# Patient Record
Sex: FEMALE | Race: WHITE | NOT HISPANIC OR LATINO | Employment: FULL TIME | URBAN - METROPOLITAN AREA
[De-identification: names, ages, dates, MRNs, and addresses within clinical notes are randomized per-mention and may not be internally consistent; named-entity substitution may affect disease eponyms.]

---

## 2017-01-04 ENCOUNTER — AMBULATORY - HEALTHEAST (OUTPATIENT)
Dept: FAMILY MEDICINE | Facility: CLINIC | Age: 27
End: 2017-01-04

## 2017-01-04 ENCOUNTER — COMMUNICATION - HEALTHEAST (OUTPATIENT)
Dept: FAMILY MEDICINE | Facility: CLINIC | Age: 27
End: 2017-01-04

## 2017-01-04 DIAGNOSIS — F98.8 ADD (ATTENTION DEFICIT DISORDER): ICD-10-CM

## 2017-01-06 ENCOUNTER — COMMUNICATION - HEALTHEAST (OUTPATIENT)
Dept: FAMILY MEDICINE | Facility: CLINIC | Age: 27
End: 2017-01-06

## 2017-01-06 DIAGNOSIS — F98.8 ADD (ATTENTION DEFICIT DISORDER): ICD-10-CM

## 2017-01-12 ENCOUNTER — COMMUNICATION - HEALTHEAST (OUTPATIENT)
Dept: FAMILY MEDICINE | Facility: CLINIC | Age: 27
End: 2017-01-12

## 2017-01-19 ENCOUNTER — COMMUNICATION - HEALTHEAST (OUTPATIENT)
Dept: FAMILY MEDICINE | Facility: CLINIC | Age: 27
End: 2017-01-19

## 2017-01-30 ENCOUNTER — COMMUNICATION - HEALTHEAST (OUTPATIENT)
Dept: FAMILY MEDICINE | Facility: CLINIC | Age: 27
End: 2017-01-30

## 2017-01-30 DIAGNOSIS — J45.41 MODERATE PERSISTENT ASTHMA WITH EXACERBATION: ICD-10-CM

## 2017-02-08 ENCOUNTER — COMMUNICATION - HEALTHEAST (OUTPATIENT)
Dept: FAMILY MEDICINE | Facility: CLINIC | Age: 27
End: 2017-02-08

## 2017-02-08 ENCOUNTER — OFFICE VISIT - HEALTHEAST (OUTPATIENT)
Dept: FAMILY MEDICINE | Facility: CLINIC | Age: 27
End: 2017-02-08

## 2017-02-08 DIAGNOSIS — J32.9 BACTERIAL SINUSITIS: ICD-10-CM

## 2017-02-08 DIAGNOSIS — F98.8 ADD (ATTENTION DEFICIT DISORDER): ICD-10-CM

## 2017-02-08 DIAGNOSIS — B96.89 BACTERIAL SINUSITIS: ICD-10-CM

## 2017-02-17 ENCOUNTER — COMMUNICATION - HEALTHEAST (OUTPATIENT)
Dept: FAMILY MEDICINE | Facility: CLINIC | Age: 27
End: 2017-02-17

## 2017-02-17 DIAGNOSIS — F17.200 TOBACCO USE DISORDER: ICD-10-CM

## 2017-03-10 ENCOUNTER — COMMUNICATION - HEALTHEAST (OUTPATIENT)
Dept: FAMILY MEDICINE | Facility: CLINIC | Age: 27
End: 2017-03-10

## 2017-03-10 DIAGNOSIS — F98.8 ADD (ATTENTION DEFICIT DISORDER): ICD-10-CM

## 2017-03-28 ENCOUNTER — COMMUNICATION - HEALTHEAST (OUTPATIENT)
Dept: FAMILY MEDICINE | Facility: CLINIC | Age: 27
End: 2017-03-28

## 2017-03-28 DIAGNOSIS — F17.200 TOBACCO USE DISORDER: ICD-10-CM

## 2017-04-10 ENCOUNTER — COMMUNICATION - HEALTHEAST (OUTPATIENT)
Dept: FAMILY MEDICINE | Facility: CLINIC | Age: 27
End: 2017-04-10

## 2017-04-10 DIAGNOSIS — F98.8 ADD (ATTENTION DEFICIT DISORDER): ICD-10-CM

## 2017-04-18 ENCOUNTER — COMMUNICATION - HEALTHEAST (OUTPATIENT)
Dept: FAMILY MEDICINE | Facility: CLINIC | Age: 27
End: 2017-04-18

## 2017-04-18 DIAGNOSIS — F17.200 TOBACCO USE DISORDER: ICD-10-CM

## 2017-05-06 ENCOUNTER — COMMUNICATION - HEALTHEAST (OUTPATIENT)
Dept: FAMILY MEDICINE | Facility: CLINIC | Age: 27
End: 2017-05-06

## 2017-05-06 DIAGNOSIS — F17.200 TOBACCO USE DISORDER: ICD-10-CM

## 2017-05-07 ENCOUNTER — COMMUNICATION - HEALTHEAST (OUTPATIENT)
Dept: FAMILY MEDICINE | Facility: CLINIC | Age: 27
End: 2017-05-07

## 2017-05-07 DIAGNOSIS — J98.01 BRONCHOSPASM: ICD-10-CM

## 2017-05-17 ENCOUNTER — COMMUNICATION - HEALTHEAST (OUTPATIENT)
Dept: FAMILY MEDICINE | Facility: CLINIC | Age: 27
End: 2017-05-17

## 2017-05-17 DIAGNOSIS — F98.8 ADD (ATTENTION DEFICIT DISORDER): ICD-10-CM

## 2017-05-24 ENCOUNTER — COMMUNICATION - HEALTHEAST (OUTPATIENT)
Dept: FAMILY MEDICINE | Facility: CLINIC | Age: 27
End: 2017-05-24

## 2017-05-24 DIAGNOSIS — F17.200 TOBACCO USE DISORDER: ICD-10-CM

## 2017-06-13 ENCOUNTER — COMMUNICATION - HEALTHEAST (OUTPATIENT)
Dept: FAMILY MEDICINE | Facility: CLINIC | Age: 27
End: 2017-06-13

## 2017-06-13 DIAGNOSIS — F98.8 ADD (ATTENTION DEFICIT DISORDER): ICD-10-CM

## 2017-07-07 ENCOUNTER — OFFICE VISIT - HEALTHEAST (OUTPATIENT)
Dept: FAMILY MEDICINE | Facility: CLINIC | Age: 27
End: 2017-07-07

## 2017-07-07 DIAGNOSIS — A74.9 POSITIVE CHLAMYIDA TEST: ICD-10-CM

## 2017-07-07 DIAGNOSIS — Z11.3 SCREENING FOR STD (SEXUALLY TRANSMITTED DISEASE): ICD-10-CM

## 2017-07-07 DIAGNOSIS — Z30.09 FAMILY PLANNING: ICD-10-CM

## 2017-07-07 DIAGNOSIS — J45.909 ASTHMA: ICD-10-CM

## 2017-07-07 LAB — HIV 1+2 AB+HIV1 P24 AG SERPL QL IA: NEGATIVE

## 2017-07-10 LAB — SYPHILIS RPR SCREEN - HISTORICAL: NORMAL

## 2017-07-12 ENCOUNTER — COMMUNICATION - HEALTHEAST (OUTPATIENT)
Dept: FAMILY MEDICINE | Facility: CLINIC | Age: 27
End: 2017-07-12

## 2017-07-13 ENCOUNTER — COMMUNICATION - HEALTHEAST (OUTPATIENT)
Dept: FAMILY MEDICINE | Facility: CLINIC | Age: 27
End: 2017-07-13

## 2017-07-13 DIAGNOSIS — F98.8 ADD (ATTENTION DEFICIT DISORDER): ICD-10-CM

## 2017-07-17 ENCOUNTER — AMBULATORY - HEALTHEAST (OUTPATIENT)
Dept: FAMILY MEDICINE | Facility: CLINIC | Age: 27
End: 2017-07-17

## 2017-07-17 DIAGNOSIS — L70.9 ACNE: ICD-10-CM

## 2017-07-17 DIAGNOSIS — E03.9 HYPOTHYROID: ICD-10-CM

## 2017-07-17 DIAGNOSIS — R63.5 WEIGHT GAIN: ICD-10-CM

## 2017-07-17 DIAGNOSIS — N93.8 DUB (DYSFUNCTIONAL UTERINE BLEEDING): ICD-10-CM

## 2017-07-26 ENCOUNTER — OFFICE VISIT - HEALTHEAST (OUTPATIENT)
Dept: FAMILY MEDICINE | Facility: CLINIC | Age: 27
End: 2017-07-26

## 2017-07-26 DIAGNOSIS — H10.10 ALLERGIC CONJUNCTIVITIS: ICD-10-CM

## 2017-08-24 ENCOUNTER — COMMUNICATION - HEALTHEAST (OUTPATIENT)
Dept: FAMILY MEDICINE | Facility: CLINIC | Age: 27
End: 2017-08-24

## 2017-08-24 DIAGNOSIS — F98.8 ADD (ATTENTION DEFICIT DISORDER): ICD-10-CM

## 2017-09-25 ENCOUNTER — COMMUNICATION - HEALTHEAST (OUTPATIENT)
Dept: FAMILY MEDICINE | Facility: CLINIC | Age: 27
End: 2017-09-25

## 2017-09-25 DIAGNOSIS — F98.8 ADD (ATTENTION DEFICIT DISORDER): ICD-10-CM

## 2017-10-03 ENCOUNTER — OFFICE VISIT - HEALTHEAST (OUTPATIENT)
Dept: FAMILY MEDICINE | Facility: CLINIC | Age: 27
End: 2017-10-03

## 2017-10-03 DIAGNOSIS — N93.8 DUB (DYSFUNCTIONAL UTERINE BLEEDING): ICD-10-CM

## 2017-10-03 DIAGNOSIS — R63.5 WEIGHT GAIN: ICD-10-CM

## 2017-10-03 DIAGNOSIS — F33.0 MILD EPISODE OF RECURRENT MAJOR DEPRESSIVE DISORDER (H): ICD-10-CM

## 2017-10-03 DIAGNOSIS — F90.0 ATTENTION DEFICIT HYPERACTIVITY DISORDER (ADHD), PREDOMINANTLY INATTENTIVE TYPE: ICD-10-CM

## 2017-10-03 DIAGNOSIS — E28.2 PCOS (POLYCYSTIC OVARIAN SYNDROME): ICD-10-CM

## 2017-10-03 DIAGNOSIS — E03.9 HYPOTHYROID: ICD-10-CM

## 2017-10-03 DIAGNOSIS — L70.9 ACNE: ICD-10-CM

## 2017-10-03 DIAGNOSIS — E66.9 OBESITY: ICD-10-CM

## 2017-10-03 DIAGNOSIS — J98.01 BRONCHOSPASM: ICD-10-CM

## 2017-10-03 DIAGNOSIS — J45.41 MODERATE PERSISTENT ASTHMA WITH EXACERBATION: ICD-10-CM

## 2017-10-04 ENCOUNTER — COMMUNICATION - HEALTHEAST (OUTPATIENT)
Dept: SCHEDULING | Facility: CLINIC | Age: 27
End: 2017-10-04

## 2017-10-04 ENCOUNTER — COMMUNICATION - HEALTHEAST (OUTPATIENT)
Dept: FAMILY MEDICINE | Facility: CLINIC | Age: 27
End: 2017-10-04

## 2017-10-07 ENCOUNTER — COMMUNICATION - HEALTHEAST (OUTPATIENT)
Dept: FAMILY MEDICINE | Facility: CLINIC | Age: 27
End: 2017-10-07

## 2017-10-09 ENCOUNTER — COMMUNICATION - HEALTHEAST (OUTPATIENT)
Dept: FAMILY MEDICINE | Facility: CLINIC | Age: 27
End: 2017-10-09

## 2017-10-09 ENCOUNTER — AMBULATORY - HEALTHEAST (OUTPATIENT)
Dept: FAMILY MEDICINE | Facility: CLINIC | Age: 27
End: 2017-10-09

## 2017-10-09 DIAGNOSIS — J98.01 BRONCHOSPASM: ICD-10-CM

## 2017-10-09 DIAGNOSIS — J45.41 MODERATE PERSISTENT ASTHMA WITH EXACERBATION: ICD-10-CM

## 2017-10-16 ENCOUNTER — RECORDS - HEALTHEAST (OUTPATIENT)
Dept: ADMINISTRATIVE | Facility: OTHER | Age: 27
End: 2017-10-16

## 2017-10-17 ENCOUNTER — AMBULATORY - HEALTHEAST (OUTPATIENT)
Dept: FAMILY MEDICINE | Facility: CLINIC | Age: 27
End: 2017-10-17

## 2017-11-02 ENCOUNTER — COMMUNICATION - HEALTHEAST (OUTPATIENT)
Dept: FAMILY MEDICINE | Facility: CLINIC | Age: 27
End: 2017-11-02

## 2017-11-03 ENCOUNTER — AMBULATORY - HEALTHEAST (OUTPATIENT)
Dept: FAMILY MEDICINE | Facility: CLINIC | Age: 27
End: 2017-11-03

## 2017-11-03 DIAGNOSIS — M79.18 MYOFASCIAL PAIN SYNDROME, CERVICAL: ICD-10-CM

## 2017-12-26 ENCOUNTER — COMMUNICATION - HEALTHEAST (OUTPATIENT)
Dept: FAMILY MEDICINE | Facility: CLINIC | Age: 27
End: 2017-12-26

## 2017-12-26 DIAGNOSIS — F90.0 ATTENTION DEFICIT HYPERACTIVITY DISORDER (ADHD), PREDOMINANTLY INATTENTIVE TYPE: ICD-10-CM

## 2017-12-28 ENCOUNTER — AMBULATORY - HEALTHEAST (OUTPATIENT)
Dept: FAMILY MEDICINE | Facility: CLINIC | Age: 27
End: 2017-12-28

## 2018-01-04 ENCOUNTER — COMMUNICATION - HEALTHEAST (OUTPATIENT)
Dept: FAMILY MEDICINE | Facility: CLINIC | Age: 28
End: 2018-01-04

## 2018-01-04 ENCOUNTER — RECORDS - HEALTHEAST (OUTPATIENT)
Dept: ADMINISTRATIVE | Facility: OTHER | Age: 28
End: 2018-01-04

## 2018-01-10 ENCOUNTER — COMMUNICATION - HEALTHEAST (OUTPATIENT)
Dept: FAMILY MEDICINE | Facility: CLINIC | Age: 28
End: 2018-01-10

## 2018-01-10 DIAGNOSIS — H10.10 ALLERGIC CONJUNCTIVITIS: ICD-10-CM

## 2018-01-31 ENCOUNTER — COMMUNICATION - HEALTHEAST (OUTPATIENT)
Dept: FAMILY MEDICINE | Facility: CLINIC | Age: 28
End: 2018-01-31

## 2018-01-31 DIAGNOSIS — F90.0 ATTENTION DEFICIT HYPERACTIVITY DISORDER (ADHD), PREDOMINANTLY INATTENTIVE TYPE: ICD-10-CM

## 2018-03-06 ENCOUNTER — COMMUNICATION - HEALTHEAST (OUTPATIENT)
Dept: SCHEDULING | Facility: CLINIC | Age: 28
End: 2018-03-06

## 2018-03-06 ENCOUNTER — COMMUNICATION - HEALTHEAST (OUTPATIENT)
Dept: FAMILY MEDICINE | Facility: CLINIC | Age: 28
End: 2018-03-06

## 2018-03-06 DIAGNOSIS — J98.01 BRONCHOSPASM: ICD-10-CM

## 2018-03-06 DIAGNOSIS — J45.41 MODERATE PERSISTENT ASTHMA WITH EXACERBATION: ICD-10-CM

## 2018-03-06 DIAGNOSIS — F90.0 ATTENTION DEFICIT HYPERACTIVITY DISORDER (ADHD), PREDOMINANTLY INATTENTIVE TYPE: ICD-10-CM

## 2018-03-12 ENCOUNTER — COMMUNICATION - HEALTHEAST (OUTPATIENT)
Dept: FAMILY MEDICINE | Facility: CLINIC | Age: 28
End: 2018-03-12

## 2018-03-13 ENCOUNTER — COMMUNICATION - HEALTHEAST (OUTPATIENT)
Dept: FAMILY MEDICINE | Facility: CLINIC | Age: 28
End: 2018-03-13

## 2018-03-13 ENCOUNTER — AMBULATORY - HEALTHEAST (OUTPATIENT)
Dept: FAMILY MEDICINE | Facility: CLINIC | Age: 28
End: 2018-03-13

## 2018-04-04 ENCOUNTER — COMMUNICATION - HEALTHEAST (OUTPATIENT)
Dept: FAMILY MEDICINE | Facility: CLINIC | Age: 28
End: 2018-04-04

## 2018-04-04 DIAGNOSIS — F90.0 ATTENTION DEFICIT HYPERACTIVITY DISORDER (ADHD), PREDOMINANTLY INATTENTIVE TYPE: ICD-10-CM

## 2018-04-10 ENCOUNTER — COMMUNICATION - HEALTHEAST (OUTPATIENT)
Dept: SCHEDULING | Facility: CLINIC | Age: 28
End: 2018-04-10

## 2018-04-11 ENCOUNTER — OFFICE VISIT - HEALTHEAST (OUTPATIENT)
Dept: FAMILY MEDICINE | Facility: CLINIC | Age: 28
End: 2018-04-11

## 2018-04-11 DIAGNOSIS — R05.9 COUGH: ICD-10-CM

## 2018-04-11 DIAGNOSIS — J20.9 ACUTE BRONCHITIS WITH BRONCHOSPASM: ICD-10-CM

## 2018-04-16 ENCOUNTER — COMMUNICATION - HEALTHEAST (OUTPATIENT)
Dept: ADMINISTRATIVE | Facility: CLINIC | Age: 28
End: 2018-04-16

## 2018-04-16 ENCOUNTER — AMBULATORY - HEALTHEAST (OUTPATIENT)
Dept: MIDWIFE SERVICES | Facility: CLINIC | Age: 28
End: 2018-04-16

## 2018-04-16 ENCOUNTER — OFFICE VISIT - HEALTHEAST (OUTPATIENT)
Dept: FAMILY MEDICINE | Facility: CLINIC | Age: 28
End: 2018-04-16

## 2018-04-16 DIAGNOSIS — E03.9 HYPOTHYROID: ICD-10-CM

## 2018-04-16 DIAGNOSIS — E66.9 OBESITY: ICD-10-CM

## 2018-04-16 DIAGNOSIS — Z32.00 POSSIBLE PREGNANCY: ICD-10-CM

## 2018-04-16 LAB
T4 FREE SERPL-MCNC: 0.6 NG/DL (ref 0.7–1.8)
TSH SERPL DL<=0.005 MIU/L-ACNC: 32.7 UIU/ML (ref 0.3–5)

## 2018-04-18 ENCOUNTER — COMMUNICATION - HEALTHEAST (OUTPATIENT)
Dept: FAMILY MEDICINE | Facility: CLINIC | Age: 28
End: 2018-04-18

## 2018-05-02 ENCOUNTER — COMMUNICATION - HEALTHEAST (OUTPATIENT)
Dept: FAMILY MEDICINE | Facility: CLINIC | Age: 28
End: 2018-05-02

## 2018-05-02 DIAGNOSIS — F90.0 ATTENTION DEFICIT HYPERACTIVITY DISORDER (ADHD), PREDOMINANTLY INATTENTIVE TYPE: ICD-10-CM

## 2018-05-22 ENCOUNTER — COMMUNICATION - HEALTHEAST (OUTPATIENT)
Dept: FAMILY MEDICINE | Facility: CLINIC | Age: 28
End: 2018-05-22

## 2018-05-22 DIAGNOSIS — R05.9 COUGH: ICD-10-CM

## 2018-05-22 DIAGNOSIS — J20.9 ACUTE BRONCHITIS WITH BRONCHOSPASM: ICD-10-CM

## 2018-05-29 ENCOUNTER — COMMUNICATION - HEALTHEAST (OUTPATIENT)
Dept: FAMILY MEDICINE | Facility: CLINIC | Age: 28
End: 2018-05-29

## 2018-06-04 ENCOUNTER — COMMUNICATION - HEALTHEAST (OUTPATIENT)
Dept: FAMILY MEDICINE | Facility: CLINIC | Age: 28
End: 2018-06-04

## 2018-06-04 DIAGNOSIS — F90.0 ATTENTION DEFICIT HYPERACTIVITY DISORDER (ADHD), PREDOMINANTLY INATTENTIVE TYPE: ICD-10-CM

## 2018-07-05 ENCOUNTER — COMMUNICATION - HEALTHEAST (OUTPATIENT)
Dept: FAMILY MEDICINE | Facility: CLINIC | Age: 28
End: 2018-07-05

## 2018-07-05 DIAGNOSIS — F90.0 ATTENTION DEFICIT HYPERACTIVITY DISORDER (ADHD), PREDOMINANTLY INATTENTIVE TYPE: ICD-10-CM

## 2018-08-01 ENCOUNTER — COMMUNICATION - HEALTHEAST (OUTPATIENT)
Dept: FAMILY MEDICINE | Facility: CLINIC | Age: 28
End: 2018-08-01

## 2018-08-01 DIAGNOSIS — F90.0 ATTENTION DEFICIT HYPERACTIVITY DISORDER (ADHD), PREDOMINANTLY INATTENTIVE TYPE: ICD-10-CM

## 2018-09-05 ENCOUNTER — COMMUNICATION - HEALTHEAST (OUTPATIENT)
Dept: FAMILY MEDICINE | Facility: CLINIC | Age: 28
End: 2018-09-05

## 2018-09-05 DIAGNOSIS — F90.0 ATTENTION DEFICIT HYPERACTIVITY DISORDER (ADHD), PREDOMINANTLY INATTENTIVE TYPE: ICD-10-CM

## 2018-10-02 ENCOUNTER — COMMUNICATION - HEALTHEAST (OUTPATIENT)
Dept: SCHEDULING | Facility: CLINIC | Age: 28
End: 2018-10-02

## 2018-10-02 ENCOUNTER — COMMUNICATION - HEALTHEAST (OUTPATIENT)
Dept: FAMILY MEDICINE | Facility: CLINIC | Age: 28
End: 2018-10-02

## 2018-10-02 DIAGNOSIS — F90.0 ATTENTION DEFICIT HYPERACTIVITY DISORDER (ADHD), PREDOMINANTLY INATTENTIVE TYPE: ICD-10-CM

## 2018-11-05 ENCOUNTER — COMMUNICATION - HEALTHEAST (OUTPATIENT)
Dept: FAMILY MEDICINE | Facility: CLINIC | Age: 28
End: 2018-11-05

## 2018-11-05 DIAGNOSIS — F90.0 ATTENTION DEFICIT HYPERACTIVITY DISORDER (ADHD), PREDOMINANTLY INATTENTIVE TYPE: ICD-10-CM

## 2018-12-06 ENCOUNTER — COMMUNICATION - HEALTHEAST (OUTPATIENT)
Dept: FAMILY MEDICINE | Facility: CLINIC | Age: 28
End: 2018-12-06

## 2018-12-06 DIAGNOSIS — F90.0 ATTENTION DEFICIT HYPERACTIVITY DISORDER (ADHD), PREDOMINANTLY INATTENTIVE TYPE: ICD-10-CM

## 2019-01-17 ENCOUNTER — COMMUNICATION - HEALTHEAST (OUTPATIENT)
Dept: FAMILY MEDICINE | Facility: CLINIC | Age: 29
End: 2019-01-17

## 2019-01-17 DIAGNOSIS — F90.0 ATTENTION DEFICIT HYPERACTIVITY DISORDER (ADHD), PREDOMINANTLY INATTENTIVE TYPE: ICD-10-CM

## 2019-02-11 ENCOUNTER — COMMUNICATION - HEALTHEAST (OUTPATIENT)
Dept: FAMILY MEDICINE | Facility: CLINIC | Age: 29
End: 2019-02-11

## 2019-02-11 DIAGNOSIS — J45.41 MODERATE PERSISTENT ASTHMA WITH EXACERBATION: ICD-10-CM

## 2019-03-05 ENCOUNTER — COMMUNICATION - HEALTHEAST (OUTPATIENT)
Dept: FAMILY MEDICINE | Facility: CLINIC | Age: 29
End: 2019-03-05

## 2019-03-05 DIAGNOSIS — F90.0 ATTENTION DEFICIT HYPERACTIVITY DISORDER (ADHD), PREDOMINANTLY INATTENTIVE TYPE: ICD-10-CM

## 2019-03-18 ENCOUNTER — OFFICE VISIT - HEALTHEAST (OUTPATIENT)
Dept: FAMILY MEDICINE | Facility: CLINIC | Age: 29
End: 2019-03-18

## 2019-03-18 ENCOUNTER — COMMUNICATION - HEALTHEAST (OUTPATIENT)
Dept: FAMILY MEDICINE | Facility: CLINIC | Age: 29
End: 2019-03-18

## 2019-03-18 DIAGNOSIS — E03.9 HYPOTHYROIDISM, UNSPECIFIED TYPE: ICD-10-CM

## 2019-03-18 DIAGNOSIS — R05.9 COUGH: ICD-10-CM

## 2019-03-18 DIAGNOSIS — F98.8 ATTENTION DEFICIT DISORDER (ADD) WITHOUT HYPERACTIVITY: ICD-10-CM

## 2019-03-18 DIAGNOSIS — J20.9 ACUTE BRONCHITIS WITH BRONCHOSPASM: ICD-10-CM

## 2019-03-18 DIAGNOSIS — J98.01 BRONCHOSPASM: ICD-10-CM

## 2019-03-18 DIAGNOSIS — Z97.5 IUD CONTRACEPTION: ICD-10-CM

## 2019-03-18 DIAGNOSIS — F90.0 ATTENTION DEFICIT HYPERACTIVITY DISORDER (ADHD), PREDOMINANTLY INATTENTIVE TYPE: ICD-10-CM

## 2019-03-18 DIAGNOSIS — J45.40 MODERATE PERSISTENT ASTHMA WITHOUT COMPLICATION: ICD-10-CM

## 2019-03-18 LAB
AMPHETAMINES UR QL SCN: NORMAL
BARBITURATES UR QL: NORMAL
BENZODIAZ UR QL: NORMAL
CANNABINOIDS UR QL SCN: NORMAL
COCAINE UR QL: NORMAL
CREAT UR-MCNC: 156.5 MG/DL
METHADONE UR QL SCN: NORMAL
OPIATES UR QL SCN: NORMAL
OXYCODONE UR QL: NORMAL
PCP UR QL SCN: NORMAL
T3 SERPL-MCNC: 95 NG/DL (ref 45–175)
T4 FREE SERPL-MCNC: 0.8 NG/DL (ref 0.7–1.8)
T4 TOTAL - HISTORICAL: 5.9 UG/DL (ref 4.5–13)
TSH SERPL DL<=0.005 MIU/L-ACNC: 11.42 UIU/ML (ref 0.3–5)

## 2019-03-18 NOTE — ASSESSMENT & PLAN NOTE
"Psychological condition is unchanged.  Continue current treatment regimen.  Psychological conditionwill be reassessed at the next regular appointment     Paying attention difficult   don't finish things\"   Skipped and \"did not get anything done\".  Don' cathc first time around  "

## 2019-03-19 LAB — THYROID PEROXIDASE ANTIBODIES - HISTORICAL: 618.6 IU/ML (ref 0–5.6)

## 2019-03-20 ENCOUNTER — HOSPITAL ENCOUNTER (OUTPATIENT)
Dept: ULTRASOUND IMAGING | Facility: HOSPITAL | Age: 29
Discharge: HOME | End: 2019-03-20
Attending: FAMILY MEDICINE

## 2019-03-20 DIAGNOSIS — E03.9 HYPOTHYROIDISM, UNSPECIFIED TYPE: ICD-10-CM

## 2019-03-23 ENCOUNTER — COMMUNICATION - HEALTHEAST (OUTPATIENT)
Dept: FAMILY MEDICINE | Facility: CLINIC | Age: 29
End: 2019-03-23

## 2019-03-23 ENCOUNTER — AMBULATORY - HEALTHEAST (OUTPATIENT)
Dept: FAMILY MEDICINE | Facility: CLINIC | Age: 29
End: 2019-03-23

## 2019-03-23 DIAGNOSIS — E03.9 HYPOTHYROIDISM, UNSPECIFIED TYPE: ICD-10-CM

## 2019-04-29 ENCOUNTER — COMMUNICATION - HEALTHEAST (OUTPATIENT)
Dept: FAMILY MEDICINE | Facility: CLINIC | Age: 29
End: 2019-04-29

## 2019-05-01 ENCOUNTER — COMMUNICATION - HEALTHEAST (OUTPATIENT)
Dept: FAMILY MEDICINE | Facility: CLINIC | Age: 29
End: 2019-05-01

## 2019-05-01 DIAGNOSIS — F90.0 ATTENTION DEFICIT HYPERACTIVITY DISORDER (ADHD), PREDOMINANTLY INATTENTIVE TYPE: ICD-10-CM

## 2019-05-04 ENCOUNTER — COMMUNICATION - HEALTHEAST (OUTPATIENT)
Dept: FAMILY MEDICINE | Facility: CLINIC | Age: 29
End: 2019-05-04

## 2019-05-04 DIAGNOSIS — E03.9 ACQUIRED HYPOTHYROIDISM: ICD-10-CM

## 2019-06-27 ENCOUNTER — COMMUNICATION - HEALTHEAST (OUTPATIENT)
Dept: FAMILY MEDICINE | Facility: CLINIC | Age: 29
End: 2019-06-27

## 2019-06-27 DIAGNOSIS — F90.0 ATTENTION DEFICIT HYPERACTIVITY DISORDER (ADHD), PREDOMINANTLY INATTENTIVE TYPE: ICD-10-CM

## 2019-07-29 ENCOUNTER — COMMUNICATION - HEALTHEAST (OUTPATIENT)
Dept: FAMILY MEDICINE | Facility: CLINIC | Age: 29
End: 2019-07-29

## 2019-07-29 DIAGNOSIS — F90.0 ATTENTION DEFICIT HYPERACTIVITY DISORDER (ADHD), PREDOMINANTLY INATTENTIVE TYPE: ICD-10-CM

## 2019-09-12 ENCOUNTER — COMMUNICATION - HEALTHEAST (OUTPATIENT)
Dept: FAMILY MEDICINE | Facility: CLINIC | Age: 29
End: 2019-09-12

## 2019-09-12 DIAGNOSIS — F90.0 ATTENTION DEFICIT HYPERACTIVITY DISORDER (ADHD), PREDOMINANTLY INATTENTIVE TYPE: ICD-10-CM

## 2019-10-10 ENCOUNTER — COMMUNICATION - HEALTHEAST (OUTPATIENT)
Dept: FAMILY MEDICINE | Facility: CLINIC | Age: 29
End: 2019-10-10

## 2019-10-10 DIAGNOSIS — F90.0 ATTENTION DEFICIT HYPERACTIVITY DISORDER (ADHD), PREDOMINANTLY INATTENTIVE TYPE: ICD-10-CM

## 2019-10-20 ENCOUNTER — COMMUNICATION - HEALTHEAST (OUTPATIENT)
Dept: FAMILY MEDICINE | Facility: CLINIC | Age: 29
End: 2019-10-20

## 2019-10-20 DIAGNOSIS — F90.0 ATTENTION DEFICIT HYPERACTIVITY DISORDER (ADHD), PREDOMINANTLY INATTENTIVE TYPE: ICD-10-CM

## 2019-10-20 DIAGNOSIS — J98.01 BRONCHOSPASM: ICD-10-CM

## 2019-10-28 ENCOUNTER — COMMUNICATION - HEALTHEAST (OUTPATIENT)
Dept: FAMILY MEDICINE | Facility: CLINIC | Age: 29
End: 2019-10-28

## 2019-10-28 DIAGNOSIS — F90.0 ATTENTION DEFICIT HYPERACTIVITY DISORDER (ADHD), PREDOMINANTLY INATTENTIVE TYPE: ICD-10-CM

## 2019-12-05 ENCOUNTER — OFFICE VISIT - HEALTHEAST (OUTPATIENT)
Dept: FAMILY MEDICINE | Facility: CLINIC | Age: 29
End: 2019-12-05

## 2019-12-05 DIAGNOSIS — E66.01 CLASS 3 SEVERE OBESITY WITH SERIOUS COMORBIDITY AND BODY MASS INDEX (BMI) OF 40.0 TO 44.9 IN ADULT, UNSPECIFIED OBESITY TYPE (H): ICD-10-CM

## 2019-12-05 DIAGNOSIS — J45.41 MODERATE PERSISTENT ASTHMA WITH EXACERBATION: ICD-10-CM

## 2019-12-05 DIAGNOSIS — L70.9 ACNE, UNSPECIFIED ACNE TYPE: ICD-10-CM

## 2019-12-05 DIAGNOSIS — L90.6 STRETCH MARKS: ICD-10-CM

## 2019-12-05 DIAGNOSIS — Z00.00 WELL ADULT EXAM: ICD-10-CM

## 2019-12-05 DIAGNOSIS — J45.901 ASTHMA WITH ACUTE EXACERBATION, UNSPECIFIED ASTHMA SEVERITY, UNSPECIFIED WHETHER PERSISTENT: ICD-10-CM

## 2019-12-05 DIAGNOSIS — E66.813 CLASS 3 SEVERE OBESITY WITH SERIOUS COMORBIDITY AND BODY MASS INDEX (BMI) OF 40.0 TO 44.9 IN ADULT, UNSPECIFIED OBESITY TYPE (H): ICD-10-CM

## 2019-12-05 DIAGNOSIS — E03.9 HYPOTHYROIDISM, UNSPECIFIED TYPE: ICD-10-CM

## 2019-12-05 DIAGNOSIS — L71.0 PERIORAL DERMATITIS: ICD-10-CM

## 2019-12-05 DIAGNOSIS — E66.01 MORBID OBESITY (H): ICD-10-CM

## 2019-12-05 LAB
T3 SERPL-MCNC: 67 NG/DL (ref 45–175)
T4 FREE SERPL-MCNC: 0.8 NG/DL (ref 0.7–1.8)
TSH SERPL DL<=0.005 MIU/L-ACNC: 7.73 UIU/ML (ref 0.3–5)

## 2019-12-05 ASSESSMENT — ANXIETY QUESTIONNAIRES
3. WORRYING TOO MUCH ABOUT DIFFERENT THINGS: SEVERAL DAYS
6. BECOMING EASILY ANNOYED OR IRRITABLE: SEVERAL DAYS
4. TROUBLE RELAXING: NOT AT ALL
IF YOU CHECKED OFF ANY PROBLEMS ON THIS QUESTIONNAIRE, HOW DIFFICULT HAVE THESE PROBLEMS MADE IT FOR YOU TO DO YOUR WORK, TAKE CARE OF THINGS AT HOME, OR GET ALONG WITH OTHER PEOPLE: NOT DIFFICULT AT ALL
1. FEELING NERVOUS, ANXIOUS, OR ON EDGE: NOT AT ALL
7. FEELING AFRAID AS IF SOMETHING AWFUL MIGHT HAPPEN: NOT AT ALL
GAD7 TOTAL SCORE: 4
2. NOT BEING ABLE TO STOP OR CONTROL WORRYING: SEVERAL DAYS
5. BEING SO RESTLESS THAT IT IS HARD TO SIT STILL: SEVERAL DAYS

## 2019-12-05 ASSESSMENT — PATIENT HEALTH QUESTIONNAIRE - PHQ9: SUM OF ALL RESPONSES TO PHQ QUESTIONS 1-9: 4

## 2019-12-05 ASSESSMENT — MIFFLIN-ST. JEOR: SCORE: 1788.59

## 2019-12-09 ENCOUNTER — COMMUNICATION - HEALTHEAST (OUTPATIENT)
Dept: FAMILY MEDICINE | Facility: CLINIC | Age: 29
End: 2019-12-09

## 2019-12-09 DIAGNOSIS — F90.0 ATTENTION DEFICIT HYPERACTIVITY DISORDER (ADHD), PREDOMINANTLY INATTENTIVE TYPE: ICD-10-CM

## 2019-12-09 LAB
GLIADIN IGA SER-ACNC: 4.4 U/ML
GLIADIN IGG SER-ACNC: 1 U/ML
IGA SERPL-MCNC: 155 MG/DL (ref 65–400)
TTG IGA SER-ACNC: 0.2 U/ML
TTG IGG SER-ACNC: <0.6 U/ML

## 2019-12-10 ENCOUNTER — AMBULATORY - HEALTHEAST (OUTPATIENT)
Dept: FAMILY MEDICINE | Facility: CLINIC | Age: 29
End: 2019-12-10

## 2019-12-10 ENCOUNTER — AMBULATORY - HEALTHEAST (OUTPATIENT)
Dept: LAB | Facility: CLINIC | Age: 29
End: 2019-12-10

## 2019-12-10 DIAGNOSIS — L90.6 STRETCH MARKS: ICD-10-CM

## 2019-12-10 DIAGNOSIS — E03.9 HYPOTHYROIDISM, UNSPECIFIED TYPE: ICD-10-CM

## 2019-12-10 DIAGNOSIS — E66.01 MORBID OBESITY (H): ICD-10-CM

## 2019-12-10 DIAGNOSIS — L70.9 ACNE, UNSPECIFIED ACNE TYPE: ICD-10-CM

## 2019-12-10 DIAGNOSIS — E66.01 CLASS 3 SEVERE OBESITY WITH SERIOUS COMORBIDITY AND BODY MASS INDEX (BMI) OF 40.0 TO 44.9 IN ADULT, UNSPECIFIED OBESITY TYPE (H): ICD-10-CM

## 2019-12-10 DIAGNOSIS — E66.813 CLASS 3 SEVERE OBESITY WITH SERIOUS COMORBIDITY AND BODY MASS INDEX (BMI) OF 40.0 TO 44.9 IN ADULT, UNSPECIFIED OBESITY TYPE (H): ICD-10-CM

## 2019-12-10 LAB
CORTIS SERPL-MCNC: 3.6 UG/DL
INSULIN SERPL-ACNC: 11.4 MU/L (ref 3–25)

## 2020-02-25 ENCOUNTER — HOSPITAL ENCOUNTER (EMERGENCY)
Facility: CLINIC | Age: 30
Discharge: HOME | End: 2020-02-25
Attending: EMERGENCY MEDICINE | Admitting: EMERGENCY MEDICINE
Payer: COMMERCIAL

## 2020-02-25 ENCOUNTER — RECORDS - HEALTHEAST (OUTPATIENT)
Dept: ADMINISTRATIVE | Facility: OTHER | Age: 30
End: 2020-02-25

## 2020-02-25 VITALS
WEIGHT: 220 LBS | HEART RATE: 82 BPM | RESPIRATION RATE: 16 BRPM | DIASTOLIC BLOOD PRESSURE: 72 MMHG | TEMPERATURE: 97.3 F | HEIGHT: 62 IN | SYSTOLIC BLOOD PRESSURE: 125 MMHG | OXYGEN SATURATION: 99 % | BODY MASS INDEX: 40.48 KG/M2

## 2020-02-25 DIAGNOSIS — A08.4 VIRAL GASTROENTERITIS: ICD-10-CM

## 2020-02-25 DIAGNOSIS — K29.00 ACUTE GASTRITIS WITHOUT HEMORRHAGE, UNSPECIFIED GASTRITIS TYPE: ICD-10-CM

## 2020-02-25 LAB
ALBUMIN SERPL-MCNC: 3.8 G/DL (ref 3.4–5)
ALP SERPL-CCNC: 94 U/L (ref 40–150)
ALT SERPL W P-5'-P-CCNC: 20 U/L (ref 0–50)
ANION GAP SERPL CALCULATED.3IONS-SCNC: 2 MMOL/L (ref 3–14)
AST SERPL W P-5'-P-CCNC: 12 U/L (ref 0–45)
BASOPHILS # BLD AUTO: 0.1 10E9/L (ref 0–0.2)
BASOPHILS NFR BLD AUTO: 0.6 %
BILIRUB SERPL-MCNC: 0.3 MG/DL (ref 0.2–1.3)
BUN SERPL-MCNC: 14 MG/DL (ref 7–30)
CALCIUM SERPL-MCNC: 8.7 MG/DL (ref 8.5–10.1)
CHLORIDE SERPL-SCNC: 104 MMOL/L (ref 94–109)
CO2 SERPL-SCNC: 31 MMOL/L (ref 20–32)
CREAT SERPL-MCNC: 0.78 MG/DL (ref 0.52–1.04)
DIFFERENTIAL METHOD BLD: ABNORMAL
EOSINOPHIL # BLD AUTO: 0.8 10E9/L (ref 0–0.7)
EOSINOPHIL NFR BLD AUTO: 6.3 %
ERYTHROCYTE [DISTWIDTH] IN BLOOD BY AUTOMATED COUNT: 12.6 % (ref 10–15)
GFR SERPL CREATININE-BSD FRML MDRD: >90 ML/MIN/{1.73_M2}
GLUCOSE SERPL-MCNC: 94 MG/DL (ref 70–99)
HCT VFR BLD AUTO: 39.3 % (ref 35–47)
HGB BLD-MCNC: 13 G/DL (ref 11.7–15.7)
IMM GRANULOCYTES # BLD: 0 10E9/L (ref 0–0.4)
IMM GRANULOCYTES NFR BLD: 0.2 %
LYMPHOCYTES # BLD AUTO: 2.9 10E9/L (ref 0.8–5.3)
LYMPHOCYTES NFR BLD AUTO: 21.9 %
MCH RBC QN AUTO: 28.1 PG (ref 26.5–33)
MCHC RBC AUTO-ENTMCNC: 33.1 G/DL (ref 31.5–36.5)
MCV RBC AUTO: 85 FL (ref 78–100)
MONOCYTES # BLD AUTO: 1.1 10E9/L (ref 0–1.3)
MONOCYTES NFR BLD AUTO: 8.6 %
NEUTROPHILS # BLD AUTO: 8.1 10E9/L (ref 1.6–8.3)
NEUTROPHILS NFR BLD AUTO: 62.4 %
NRBC # BLD AUTO: 0 10*3/UL
NRBC BLD AUTO-RTO: 0 /100
PLATELET # BLD AUTO: 344 10E9/L (ref 150–450)
POTASSIUM SERPL-SCNC: 3.3 MMOL/L (ref 3.4–5.3)
PROT SERPL-MCNC: 7.8 G/DL (ref 6.8–8.8)
RBC # BLD AUTO: 4.62 10E12/L (ref 3.8–5.2)
SODIUM SERPL-SCNC: 137 MMOL/L (ref 133–144)
WBC # BLD AUTO: 13.1 10E9/L (ref 4–11)

## 2020-02-25 PROCEDURE — 25000132 ZZH RX MED GY IP 250 OP 250 PS 637: Performed by: EMERGENCY MEDICINE

## 2020-02-25 PROCEDURE — 25800030 ZZH RX IP 258 OP 636: Performed by: EMERGENCY MEDICINE

## 2020-02-25 PROCEDURE — 96374 THER/PROPH/DIAG INJ IV PUSH: CPT

## 2020-02-25 PROCEDURE — 25000128 H RX IP 250 OP 636: Performed by: EMERGENCY MEDICINE

## 2020-02-25 PROCEDURE — 96361 HYDRATE IV INFUSION ADD-ON: CPT

## 2020-02-25 PROCEDURE — 80053 COMPREHEN METABOLIC PANEL: CPT | Performed by: EMERGENCY MEDICINE

## 2020-02-25 PROCEDURE — 96375 TX/PRO/DX INJ NEW DRUG ADDON: CPT

## 2020-02-25 PROCEDURE — 25000125 ZZHC RX 250: Performed by: EMERGENCY MEDICINE

## 2020-02-25 PROCEDURE — 85025 COMPLETE CBC W/AUTO DIFF WBC: CPT | Performed by: EMERGENCY MEDICINE

## 2020-02-25 PROCEDURE — 99284 EMERGENCY DEPT VISIT MOD MDM: CPT | Mod: 25

## 2020-02-25 RX ORDER — FAMOTIDINE 20 MG/1
20 TABLET, FILM COATED ORAL 2 TIMES DAILY
Qty: 14 TABLET | Refills: 0 | Status: SHIPPED | OUTPATIENT
Start: 2020-02-25 | End: 2020-03-03

## 2020-02-25 RX ORDER — METOCLOPRAMIDE HYDROCHLORIDE 5 MG/ML
10 INJECTION INTRAMUSCULAR; INTRAVENOUS ONCE
Status: COMPLETED | OUTPATIENT
Start: 2020-02-25 | End: 2020-02-25

## 2020-02-25 RX ORDER — METOCLOPRAMIDE 10 MG/1
10 TABLET ORAL
Qty: 30 TABLET | Refills: 0 | Status: SHIPPED | OUTPATIENT
Start: 2020-02-25 | End: 2022-01-31

## 2020-02-25 RX ORDER — MORPHINE SULFATE 4 MG/ML
4 INJECTION, SOLUTION INTRAMUSCULAR; INTRAVENOUS
Status: DISCONTINUED | OUTPATIENT
Start: 2020-02-25 | End: 2020-02-25 | Stop reason: HOSPADM

## 2020-02-25 RX ORDER — ONDANSETRON 2 MG/ML
4 INJECTION INTRAMUSCULAR; INTRAVENOUS EVERY 30 MIN PRN
Status: DISCONTINUED | OUTPATIENT
Start: 2020-02-25 | End: 2020-02-25 | Stop reason: HOSPADM

## 2020-02-25 RX ADMIN — METOCLOPRAMIDE HYDROCHLORIDE 10 MG: 5 INJECTION INTRAMUSCULAR; INTRAVENOUS at 03:03

## 2020-02-25 RX ADMIN — SODIUM CHLORIDE 1000 ML: 9 INJECTION, SOLUTION INTRAVENOUS at 01:06

## 2020-02-25 RX ADMIN — FAMOTIDINE 20 MG: 10 INJECTION, SOLUTION INTRAVENOUS at 01:09

## 2020-02-25 RX ADMIN — LIDOCAINE HYDROCHLORIDE 30 ML: 20 SOLUTION ORAL; TOPICAL at 03:20

## 2020-02-25 RX ADMIN — ONDANSETRON 4 MG: 2 INJECTION INTRAMUSCULAR; INTRAVENOUS at 01:07

## 2020-02-25 RX ADMIN — MORPHINE SULFATE 4 MG: 4 INJECTION INTRAVENOUS at 01:08

## 2020-02-25 ASSESSMENT — ENCOUNTER SYMPTOMS
BLOOD IN STOOL: 0
ABDOMINAL PAIN: 1
NAUSEA: 1
VOMITING: 1

## 2020-02-25 ASSESSMENT — MIFFLIN-ST. JEOR: SCORE: 1661.16

## 2020-02-25 NOTE — ED PROVIDER NOTES
"  History     Chief Complaint:  Nausea & Vomiting    HPI   Lucy Kaur is a 30 year old female with a history of hypothyroidism, among others who presents for evaluation of acute onset nausea, associated with emesis, diarrhea, and stabbing abdominal pain, that began 2 days ago. The patient states 2 days ago she had acute onset episodes of emesis followed by diarrhea later in the day. The following day she had decreased episodes of emesis after nausea medications and she thought she was getting better today, but she had another episode of emesis after eating food today. Symptoms not subsiding prompted her presentation.    Here, the patient also endorses intermittent stabbing abdominal pain, which she describes as similar to labor contractions when she had her kids. She denies any ill contacts with similar symptoms or recent travel. She denies any gross hematochezia or hematemesis.     Allergies:  No Known Drug Allergies     Medications:    Adderall  Singulair  Symbicort  Flovent   Proventil   Levothyroxine     Past Medical History:    Asthma  ADHD  Depression  Obesity  Anxiety  Hypothyroidism    Past Surgical History:    Cholecystectomy  Foot surgery, bilateral   Tonsillectomy  Adenoidectomy    Family History:    Father - Asthma  Mother - Mental illness    Social History:  The patient was unaccompanied to the ED.  Smoking Status: Former  Smokeless Tobacco: Never  Alcohol Use: No  Drug Use: No   Marital Status:       Review of Systems   Gastrointestinal: Positive for abdominal pain, nausea and vomiting. Negative for blood in stool.   All other systems reviewed and are negative.        Physical Exam     Patient Vitals for the past 24 hrs:   BP Temp Temp src Pulse Heart Rate Resp SpO2 Height Weight   02/25/20 0023 (!) 140/90 97.3  F (36.3  C) Oral 91 91 14 100 % 1.575 m (5' 2\") 99.8 kg (220 lb)      Physical Exam  Constitutional:       Appearance: She is well-developed.   HENT:      Right Ear: Tympanic " membrane and external ear normal.      Left Ear: Tympanic membrane and external ear normal.      Mouth/Throat:      Mouth: Mucous membranes are dry.      Pharynx: Oropharynx is clear. No oropharyngeal exudate or posterior oropharyngeal erythema.   Eyes:      General: No scleral icterus.     Extraocular Movements: Extraocular movements intact.      Conjunctiva/sclera: Conjunctivae normal.      Pupils: Pupils are equal, round, and reactive to light.   Neck:      Musculoskeletal: Normal range of motion and neck supple.   Cardiovascular:      Rate and Rhythm: Normal rate and regular rhythm.      Pulses: Normal pulses.      Heart sounds: Normal heart sounds. No murmur. No friction rub. No gallop.    Pulmonary:      Effort: Pulmonary effort is normal. No respiratory distress.      Breath sounds: Normal breath sounds. No wheezing or rales.   Abdominal:      General: Bowel sounds are normal. There is no distension.      Palpations: Abdomen is soft. There is no mass.      Tenderness: There is abdominal tenderness.      Comments: epigastric TTP   Musculoskeletal: Normal range of motion.   Skin:     General: Skin is warm and dry.      Findings: No rash.   Neurological:      Mental Status: She is alert.   Psychiatric:         Mood and Affect: Mood normal.         Emergency Department Course   Laboratory:  Laboratory findings were communicated with the patient who voiced understanding of the findings.    CBC: WBC 13.1 (H) o/w WNL (HGB 13.0, )   CMP: Potassium 3.3 (L), Anion gap 2 (L) o/w WNL (Creatinine 0.78)     Interventions:  0106 0.9% NaCl bolus 1000 mL IV   0107 Zofran 4 mg IV  0108 Morphine 4 mg IV  0109 Pepcid 20 mg IV   0303 Reglan 10 mg IV  0320 GI Cocktail (Maalox/Mylanta and viscous Lidocaine), 30 mL suspension, PO      Emergency Department Course:  Nursing notes and vitals reviewed.  IV was inserted and blood was drawn for laboratory testing, results above.     (0046)   I performed an exam of the patient as  documented above. History obtained from patient.    (0222)   I rechecked the patient and discussed results and plan of care. She is attempted to pass a PO challenge with water. She still endorses a burning sensation, but notes it is better. GI cocktail is given with improvement of the burning sensation.     Findings and plan explained to the Patient. Patient discharged home with instructions regarding supportive care, medications, and reasons to return. The importance of close follow-up was reviewed. The patient was prescribed Pepcid and Reglan. I personally reviewed the laboratory results with the Patient and answered all related questions prior to discharge.    Impression & Plan      Medical Decision Making:  Lucy Kaur is a 30 year old female who presents for evaluation of nausea, vomiting and diarrhea with*mild abdominal discomfort with palpation in a nonfocal abdominal exam.  Details of the patient's history can be noted in the HPI.  Differential diagnosis included viral gastroenteritis, bacterial infection of the large intestine (salmonella, shigella, campylbacter, e coli, etc.), colitis, gastroenteritis, colitis, food poisoning, cholecystitis, UTI, pyelonephritis, appendicitis, pancreatitis, diverticulitis, amongst others appears.  Basic laboratory analysis was completed here due to the patient's frequent episodes of loose stools/emesis.  They returned without significant abnormalities.  Interventions here in the ED included IV fluids, Zofran, reglan and GI cocktail with H2 blocker. Upon reevaluation, patient noted improvement in their symptoms. PO challenge was completed, patient tolerated oral food/fluids.  At this point, I felt comfortable with the patient's discharge and outpatient management.  There are no signs of worrisome intra-abdominal pathologies detected in the patient's visit today.  Benign abdominal exam without rebound, guarding, marked tenderness with palpation.  I do not feel that  additional testing such as imaging studies are necessary here today. I encouraged the patient to increase hydration at home.  They are discharge with Zofran and reglan.  They will follow-up with her primary care provider within the next few days for recheck of symptoms if they persist.  The return to the ED for any changing or worsening symptoms, uncontrolled vomiting, dark/bloody stools or emesis, fever, fevers >101F, new concerns.  All questions were answered prior to the patient's discharge.  They are in agreement with the treatment plan as stated above.     Diagnosis:    ICD-10-CM    1. Viral gastroenteritis A08.4    2. Acute gastritis without hemorrhage, unspecified gastritis type K29.00       Disposition:   Discharge to home.      Discharge Medications:     Medication List      Started    famotidine 20 MG tablet  Commonly known as:  PEPCID  20 mg, Oral, 2 TIMES DAILY     metoclopramide 10 MG tablet  Commonly known as:  REGLAN  10 mg, Oral, 4 TIMES DAILY BEFORE MEALS & NIGHTLY            Scribe Disclosure:  Erasmo SOTO, am serving as a scribe at 12:46 AM on 2/25/2020 to document services personally performed by Meghan Rosas MD, based on my observations and the provider's statements to me.  2/25/2020   [facility] EMERGENCY DEPARTMENT       Meghan Rosas MD  02/25/20 0606

## 2020-02-25 NOTE — ED TRIAGE NOTES
N/vd since Saturday. A little of blood in vomit but denies any blood in stool. Intermittent abdominal pain started yesterday. ABCs intact.

## 2020-02-25 NOTE — ED AVS SNAPSHOT
[facility]  [address]  [facility] [address]  Phone:  [phone]  Fax:  [phone]                                    Lucy Kaur   MRN: [id number]    Department:  [facility]   Date of Visit:  2/25/2020           After Visit Summary Signature Page    I have received my discharge instructions, and my questions have been answered. I have discussed any challenges I see with this plan with the nurse or doctor.    ..........................................................................................................................................  Patient/Patient Representative Signature      ..........................................................................................................................................  Patient Representative Print Name and Relationship to Patient    ..................................................               ................................................  Date                                   Time    ..........................................................................................................................................  Reviewed by Signature/Title    ...................................................              ..............................................  Date                                               Time          22EPIC Rev 08/18

## 2020-02-26 ENCOUNTER — COMMUNICATION - HEALTHEAST (OUTPATIENT)
Dept: FAMILY MEDICINE | Facility: CLINIC | Age: 30
End: 2020-02-26

## 2020-02-26 DIAGNOSIS — F90.0 ATTENTION DEFICIT HYPERACTIVITY DISORDER (ADHD), PREDOMINANTLY INATTENTIVE TYPE: ICD-10-CM

## 2020-03-12 ENCOUNTER — OFFICE VISIT - HEALTHEAST (OUTPATIENT)
Dept: FAMILY MEDICINE | Facility: CLINIC | Age: 30
End: 2020-03-12

## 2020-03-12 DIAGNOSIS — F33.0 MILD EPISODE OF RECURRENT MAJOR DEPRESSIVE DISORDER (H): ICD-10-CM

## 2020-03-12 DIAGNOSIS — F90.0 ATTENTION DEFICIT HYPERACTIVITY DISORDER (ADHD), PREDOMINANTLY INATTENTIVE TYPE: ICD-10-CM

## 2020-03-12 DIAGNOSIS — F98.8 ATTENTION DEFICIT DISORDER, UNSPECIFIED HYPERACTIVITY PRESENCE: ICD-10-CM

## 2020-03-12 DIAGNOSIS — E66.01 MORBID OBESITY (H): ICD-10-CM

## 2020-03-12 DIAGNOSIS — E04.1 THYROID NODULE: ICD-10-CM

## 2020-03-12 DIAGNOSIS — E03.9 HYPOTHYROIDISM, UNSPECIFIED TYPE: ICD-10-CM

## 2020-03-12 LAB
AMPHETAMINES UR QL SCN: NORMAL
BARBITURATES UR QL: NORMAL
BENZODIAZ UR QL: NORMAL
CANNABINOIDS UR QL SCN: NORMAL
COCAINE UR QL: NORMAL
CREAT UR-MCNC: 22.8 MG/DL
METHADONE UR QL SCN: NORMAL
OPIATES UR QL SCN: NORMAL
OXYCODONE UR QL: NORMAL
PCP UR QL SCN: NORMAL
T4 FREE SERPL-MCNC: 0.6 NG/DL (ref 0.7–1.8)
T4 TOTAL - HISTORICAL: 4.2 UG/DL (ref 4.5–13)
TSH SERPL DL<=0.005 MIU/L-ACNC: 19.9 UIU/ML (ref 0.3–5)

## 2020-03-12 ASSESSMENT — MIFFLIN-ST. JEOR: SCORE: 1733.27

## 2020-03-13 ENCOUNTER — COMMUNICATION - HEALTHEAST (OUTPATIENT)
Dept: FAMILY MEDICINE | Facility: CLINIC | Age: 30
End: 2020-03-13

## 2020-03-13 ENCOUNTER — AMBULATORY - HEALTHEAST (OUTPATIENT)
Dept: FAMILY MEDICINE | Facility: CLINIC | Age: 30
End: 2020-03-13

## 2020-03-13 DIAGNOSIS — E03.9 HYPOTHYROIDISM, UNSPECIFIED TYPE: ICD-10-CM

## 2020-03-18 ENCOUNTER — COMMUNICATION - HEALTHEAST (OUTPATIENT)
Dept: FAMILY MEDICINE | Facility: CLINIC | Age: 30
End: 2020-03-18

## 2020-04-02 ENCOUNTER — COMMUNICATION - HEALTHEAST (OUTPATIENT)
Dept: FAMILY MEDICINE | Facility: CLINIC | Age: 30
End: 2020-04-02

## 2020-04-07 ENCOUNTER — COMMUNICATION - HEALTHEAST (OUTPATIENT)
Dept: FAMILY MEDICINE | Facility: CLINIC | Age: 30
End: 2020-04-07

## 2020-05-06 ENCOUNTER — COMMUNICATION - HEALTHEAST (OUTPATIENT)
Dept: FAMILY MEDICINE | Facility: CLINIC | Age: 30
End: 2020-05-06

## 2020-05-06 DIAGNOSIS — J45.901 MODERATE ASTHMA WITH EXACERBATION, UNSPECIFIED WHETHER PERSISTENT: ICD-10-CM

## 2020-05-18 ENCOUNTER — COMMUNICATION - HEALTHEAST (OUTPATIENT)
Dept: FAMILY MEDICINE | Facility: CLINIC | Age: 30
End: 2020-05-18

## 2020-05-21 ENCOUNTER — COMMUNICATION - HEALTHEAST (OUTPATIENT)
Dept: FAMILY MEDICINE | Facility: CLINIC | Age: 30
End: 2020-05-21

## 2020-06-07 ENCOUNTER — COMMUNICATION - HEALTHEAST (OUTPATIENT)
Dept: FAMILY MEDICINE | Facility: CLINIC | Age: 30
End: 2020-06-07

## 2020-06-07 DIAGNOSIS — J45.901 MODERATE ASTHMA WITH EXACERBATION, UNSPECIFIED WHETHER PERSISTENT: ICD-10-CM

## 2020-06-08 ENCOUNTER — COMMUNICATION - HEALTHEAST (OUTPATIENT)
Dept: FAMILY MEDICINE | Facility: CLINIC | Age: 30
End: 2020-06-08

## 2020-06-08 DIAGNOSIS — F90.0 ATTENTION DEFICIT HYPERACTIVITY DISORDER (ADHD), PREDOMINANTLY INATTENTIVE TYPE: ICD-10-CM

## 2020-06-18 ENCOUNTER — HOSPITAL ENCOUNTER (OUTPATIENT)
Dept: ULTRASOUND IMAGING | Facility: HOSPITAL | Age: 30
Discharge: HOME | End: 2020-06-18
Attending: FAMILY MEDICINE

## 2020-06-18 DIAGNOSIS — E04.1 THYROID NODULE: ICD-10-CM

## 2020-06-19 ENCOUNTER — COMMUNICATION - HEALTHEAST (OUTPATIENT)
Dept: FAMILY MEDICINE | Facility: CLINIC | Age: 30
End: 2020-06-19

## 2020-07-16 ENCOUNTER — COMMUNICATION - HEALTHEAST (OUTPATIENT)
Dept: FAMILY MEDICINE | Facility: CLINIC | Age: 30
End: 2020-07-16

## 2020-07-16 DIAGNOSIS — F90.0 ATTENTION DEFICIT HYPERACTIVITY DISORDER (ADHD), PREDOMINANTLY INATTENTIVE TYPE: ICD-10-CM

## 2020-09-02 ENCOUNTER — COMMUNICATION - HEALTHEAST (OUTPATIENT)
Dept: FAMILY MEDICINE | Facility: CLINIC | Age: 30
End: 2020-09-02

## 2020-09-02 DIAGNOSIS — F90.0 ATTENTION DEFICIT HYPERACTIVITY DISORDER (ADHD), PREDOMINANTLY INATTENTIVE TYPE: ICD-10-CM

## 2020-10-21 ENCOUNTER — COMMUNICATION - HEALTHEAST (OUTPATIENT)
Dept: FAMILY MEDICINE | Facility: CLINIC | Age: 30
End: 2020-10-21

## 2020-10-21 DIAGNOSIS — F90.0 ATTENTION DEFICIT HYPERACTIVITY DISORDER (ADHD), PREDOMINANTLY INATTENTIVE TYPE: ICD-10-CM

## 2020-10-30 ENCOUNTER — OFFICE VISIT - HEALTHEAST (OUTPATIENT)
Dept: FAMILY MEDICINE | Facility: CLINIC | Age: 30
End: 2020-10-30

## 2020-10-30 DIAGNOSIS — J45.901 MODERATE ASTHMA WITH EXACERBATION, UNSPECIFIED WHETHER PERSISTENT: ICD-10-CM

## 2020-10-30 DIAGNOSIS — J02.9 SORE THROAT: ICD-10-CM

## 2020-10-30 DIAGNOSIS — E04.1 THYROID NODULE: ICD-10-CM

## 2020-10-30 DIAGNOSIS — E66.813 CLASS 3 SEVERE OBESITY WITH SERIOUS COMORBIDITY AND BODY MASS INDEX (BMI) OF 40.0 TO 44.9 IN ADULT, UNSPECIFIED OBESITY TYPE (H): ICD-10-CM

## 2020-10-30 DIAGNOSIS — E03.9 HYPOTHYROIDISM, UNSPECIFIED TYPE: ICD-10-CM

## 2020-10-30 DIAGNOSIS — J45.901 ASTHMA WITH ACUTE EXACERBATION, UNSPECIFIED ASTHMA SEVERITY, UNSPECIFIED WHETHER PERSISTENT: ICD-10-CM

## 2020-10-30 DIAGNOSIS — L70.9 ACNE, UNSPECIFIED ACNE TYPE: ICD-10-CM

## 2020-10-30 DIAGNOSIS — F90.0 ATTENTION DEFICIT HYPERACTIVITY DISORDER (ADHD), PREDOMINANTLY INATTENTIVE TYPE: ICD-10-CM

## 2020-10-30 DIAGNOSIS — E66.01 CLASS 3 SEVERE OBESITY WITH SERIOUS COMORBIDITY AND BODY MASS INDEX (BMI) OF 40.0 TO 44.9 IN ADULT, UNSPECIFIED OBESITY TYPE (H): ICD-10-CM

## 2020-10-30 ASSESSMENT — PATIENT HEALTH QUESTIONNAIRE - PHQ9: SUM OF ALL RESPONSES TO PHQ QUESTIONS 1-9: 2

## 2020-10-30 NOTE — ASSESSMENT & PLAN NOTE
"Side effects  No     Help?  Make me more \"more task\"  \"actually focus on project\"    Sleep ?  No  Jittery ? No      NO  "

## 2020-10-30 NOTE — ASSESSMENT & PLAN NOTE
"Flovent 100 mc 2 puffs two times a day as needed   Montelukast  10 mg HS    Rescue Proair as needed  \"really not needed it\"  "

## 2020-11-04 ENCOUNTER — COMMUNICATION - HEALTHEAST (OUTPATIENT)
Dept: FAMILY MEDICINE | Facility: CLINIC | Age: 30
End: 2020-11-04

## 2020-11-17 ENCOUNTER — RECORDS - HEALTHEAST (OUTPATIENT)
Dept: ADMINISTRATIVE | Facility: OTHER | Age: 30
End: 2020-11-17

## 2021-01-11 ENCOUNTER — COMMUNICATION - HEALTHEAST (OUTPATIENT)
Dept: FAMILY MEDICINE | Facility: CLINIC | Age: 31
End: 2021-01-11

## 2021-01-11 ENCOUNTER — COMMUNICATION - HEALTHEAST (OUTPATIENT)
Dept: ADMINISTRATIVE | Facility: CLINIC | Age: 31
End: 2021-01-11

## 2021-04-23 ENCOUNTER — COMMUNICATION - HEALTHEAST (OUTPATIENT)
Dept: FAMILY MEDICINE | Facility: CLINIC | Age: 31
End: 2021-04-23

## 2021-04-23 DIAGNOSIS — J45.41 MODERATE PERSISTENT ASTHMA WITH EXACERBATION: ICD-10-CM

## 2021-05-26 ASSESSMENT — PATIENT HEALTH QUESTIONNAIRE - PHQ9
SUM OF ALL RESPONSES TO PHQ QUESTIONS 1-9: 4
SUM OF ALL RESPONSES TO PHQ QUESTIONS 1-9: 2

## 2021-05-28 ENCOUNTER — RECORDS - HEALTHEAST (OUTPATIENT)
Dept: ADMINISTRATIVE | Facility: CLINIC | Age: 31
End: 2021-05-28

## 2021-05-28 ASSESSMENT — ASTHMA QUESTIONNAIRES
ACT_TOTALSCORE: 7
ACT_TOTALSCORE: 16
ACT_TOTALSCORE: 21

## 2021-05-28 ASSESSMENT — ANXIETY QUESTIONNAIRES: GAD7 TOTAL SCORE: 4

## 2021-05-28 NOTE — TELEPHONE ENCOUNTER
Controlled Substance Refill Request  Medication Name:   Requested Prescriptions     Pending Prescriptions Disp Refills     dextroamphetamine-amphetamine (ADDERALL XR) 30 MG 24 hr capsule 30 capsule 0     Sig: Take 1 capsule (30 mg total) by mouth every morning.     Date Last Fill: 4/18/2019  Pharmacy: [id number] - [place], [place]       Submit electronically to pharmacy  Controlled Substance Agreement Date Scanned:   Encounter-Level CSA Scan Date - 10/03/2017:    Scan on 10/6/2017 11:39 AM (below)             Encounter-Level CSA Scan Date - 11/14/2016:    Scan on 11/14/2016 (below)         Last office visit with prescriber/PCP: 3/18/2019 Oseas Foster MD OR same dept: 3/18/2019 Oseas Foster MD OR same specialty: 3/18/2019 Oseas Foster MD  Last physical: 11/14/2016 Last MTM visit: Visit date not found

## 2021-05-28 NOTE — TELEPHONE ENCOUNTER
Patient came in to drop off form. She needs the form signed and faxed by 5/1/2019.  Please call her back at [phone] when the form is faxed. She is okay if you leave her a message.

## 2021-05-28 NOTE — TELEPHONE ENCOUNTER
Refill Approved    Rx renewed per Medication Renewal Policy. Medication was last renewed on 3/18/19.    Ov: 3/18/19    Angela Andrade, [facility] Triage/Med Refill 5/5/2019     Requested Prescriptions   Pending Prescriptions Disp Refills     levothyroxine (SYNTHROID, LEVOTHROID) 100 MCG tablet [Pharmacy Med Name: LEVOTHYROXINE 0.100MG (100MCG) TAB] 90 tablet 0     Sig: TAKE 1 TABLET BY MOUTH DAILY       Thyroid Hormones Protocol Passed - 5/4/2019 10:59 AM        Passed - Provider visit in past 12 months or next 3 months     Last office visit with prescriber/PCP: 3/18/2019 Oseas Foster MD OR same dept: 3/18/2019 Oseas Foster MD OR same specialty: 3/18/2019 Oseas Foster MD  Last physical: 11/14/2016 Last MTM visit: Visit date not found   Next visit within 3 mo: Visit date not found  Next physical within 3 mo: Visit date not found  Prescriber OR PCP: Oseas Foster MD  Last diagnosis associated with med order: There are no diagnoses linked to this encounter.  If protocol passes may refill for 12 months if within 3 months of last provider visit (or a total of 15 months).             Passed - TSH on file in past 12 months for patient age 12 & older     TSH   Date Value Ref Range Status   03/18/2019 11.42 (H) 0.30 - 5.00 uIU/mL Final

## 2021-05-30 ENCOUNTER — RECORDS - HEALTHEAST (OUTPATIENT)
Dept: ADMINISTRATIVE | Facility: CLINIC | Age: 31
End: 2021-05-30

## 2021-05-30 VITALS — WEIGHT: 217.6 LBS | BODY MASS INDEX: 38.55 KG/M2

## 2021-05-30 NOTE — TELEPHONE ENCOUNTER
Patient is out of medication.          fontrolled Substance Refill Request  Medication Name:   Requested Prescriptions     Pending Prescriptions Disp Refills     dextroamphetamine-amphetamine (ADDERALL XR) 30 MG 24 hr capsule 30 capsule 0     Sig: Take 1 capsule (30 mg total) by mouth every morning.     Date Last Fill: 5/2/19  Ph[facility]: [id number]      Submit electronically to pharmacy  Controlled Substance Agreement Date Scanned:   Encounter-Level CSA Scan Date - 10/03/2017:    Scan on 10/6/2017 11:39 AM (below)             Encounter-Level CSA Scan Date - 11/14/2016:    Scan on 11/14/2016 (below)         Last office visit with prescriber/PCP: 3/18/2019 Oseas Foster MD OR same dept: 3/18/2019 Oseas Foster MD OR same specialty: 3/18/2019 Oseas Foster MD  Last physical: 11/14/2016 Last MTM visit: Visit date not found

## 2021-05-30 NOTE — TELEPHONE ENCOUNTER
Last visit for ADHD 3/18/10 - CSA was signed 3/18/19 and is in letters tab, signed form has not been scanned in

## 2021-05-30 NOTE — TELEPHONE ENCOUNTER
Patient reported she is out of this medication.    Controlled Substance Refill Request  Medication Name:   Requested Prescriptions     Pending Prescriptions Disp Refills     dextroamphetamine-amphetamine (ADDERALL XR) 30 MG 24 hr capsule 30 capsule 0     Sig: Take 1 capsule (30 mg total) by mouth every morning.     Date Last Fill: 6/28/2019  Pharmacy: [facility]      Submit electronically to pharmacy.  Controlled Substance Agreement Date Scanned:   Encounter-Level CSA Scan Date - 10/03/2017:    Scan on 10/6/2017 11:39 AM (below)             Encounter-Level CSA Scan Date - 11/14/2016:    Scan on 11/14/2016 (below)         Last office visit with prescriber/PCP: 3/18/2019 Oseas Foster MD OR same dept: 3/18/2019 Oseas Foster MD OR same specialty: 3/18/2019 Oseas Foster MD  Last physical: 11/14/2016 Last MTM visit: Visit date not found

## 2021-05-31 ENCOUNTER — RECORDS - HEALTHEAST (OUTPATIENT)
Dept: ADMINISTRATIVE | Facility: CLINIC | Age: 31
End: 2021-05-31

## 2021-05-31 VITALS — BODY MASS INDEX: 42.98 KG/M2 | WEIGHT: 235 LBS

## 2021-05-31 VITALS — WEIGHT: 228.3 LBS | BODY MASS INDEX: 40.44 KG/M2

## 2021-05-31 VITALS — WEIGHT: 229 LBS | BODY MASS INDEX: 40.57 KG/M2

## 2021-06-01 ENCOUNTER — RECORDS - HEALTHEAST (OUTPATIENT)
Dept: ADMINISTRATIVE | Facility: CLINIC | Age: 31
End: 2021-06-01

## 2021-06-01 ENCOUNTER — OFFICE VISIT - HEALTHEAST (OUTPATIENT)
Dept: FAMILY MEDICINE | Facility: CLINIC | Age: 31
End: 2021-06-01

## 2021-06-01 VITALS — BODY MASS INDEX: 44.63 KG/M2 | WEIGHT: 244 LBS

## 2021-06-01 VITALS — WEIGHT: 235 LBS | BODY MASS INDEX: 42.98 KG/M2

## 2021-06-01 DIAGNOSIS — E66.01 CLASS 3 SEVERE OBESITY WITH SERIOUS COMORBIDITY AND BODY MASS INDEX (BMI) OF 40.0 TO 44.9 IN ADULT, UNSPECIFIED OBESITY TYPE (H): ICD-10-CM

## 2021-06-01 DIAGNOSIS — F90.0 ATTENTION DEFICIT HYPERACTIVITY DISORDER (ADHD), PREDOMINANTLY INATTENTIVE TYPE: ICD-10-CM

## 2021-06-01 DIAGNOSIS — E66.813 CLASS 3 SEVERE OBESITY WITH SERIOUS COMORBIDITY AND BODY MASS INDEX (BMI) OF 40.0 TO 44.9 IN ADULT, UNSPECIFIED OBESITY TYPE (H): ICD-10-CM

## 2021-06-01 DIAGNOSIS — Z20.822 EXPOSURE TO COVID-19 VIRUS: ICD-10-CM

## 2021-06-01 DIAGNOSIS — J45.901 ASTHMA WITH ACUTE EXACERBATION, UNSPECIFIED ASTHMA SEVERITY, UNSPECIFIED WHETHER PERSISTENT: ICD-10-CM

## 2021-06-01 DIAGNOSIS — E03.9 HYPOTHYROIDISM, UNSPECIFIED TYPE: ICD-10-CM

## 2021-06-01 DIAGNOSIS — L30.9 DERMATITIS: ICD-10-CM

## 2021-06-01 DIAGNOSIS — F33.0 MILD EPISODE OF RECURRENT MAJOR DEPRESSIVE DISORDER (H): ICD-10-CM

## 2021-06-01 LAB
T4 FREE SERPL-MCNC: 0.8 NG/DL (ref 0.7–1.8)
TSH SERPL DL<=0.005 MIU/L-ACNC: 6.82 UIU/ML (ref 0.3–5)

## 2021-06-01 ASSESSMENT — PATIENT HEALTH QUESTIONNAIRE - PHQ9: SUM OF ALL RESPONSES TO PHQ QUESTIONS 1-9: 4

## 2021-06-01 NOTE — TELEPHONE ENCOUNTER
Controlled Substance Refill Request  Medication Name:   Requested Prescriptions     Pending Prescriptions Disp Refills     dextroamphetamine-amphetamine (ADDERALL XR) 30 MG 24 hr capsule 30 capsule 0     Sig: Take 1 capsule (30 mg total) by mouth every morning.     Date Last Fill: 7/30/19  Pharmacy: [facility] # [id number]      Submit electronically to pharmacy  Controlled Substance Agreement Date Scanned:   Encounter-Level CSA Scan Date - 10/03/2017:    Scan on 10/6/2017 11:39 AM (below)             Encounter-Level CSA Scan Date - 11/14/2016:    Scan on 11/14/2016 (below)         Last office visit with prescriber/PCP: 3/18/2019 Oseas Foster MD OR same dept: 3/18/2019 Oseas Foster MD OR same specialty: 3/18/2019 Oseas Foster MD  Last physical: 11/14/2016 Last MTM visit: Visit date not found

## 2021-06-01 NOTE — ASSESSMENT & PLAN NOTE
Neck   Itches  Prickles   Activated by sweat     Now neck anterior  Was eyes and corners of the mouth

## 2021-06-02 ENCOUNTER — RECORDS - HEALTHEAST (OUTPATIENT)
Dept: ADMINISTRATIVE | Facility: CLINIC | Age: 31
End: 2021-06-02

## 2021-06-02 VITALS — WEIGHT: 226 LBS | BODY MASS INDEX: 41.34 KG/M2

## 2021-06-02 NOTE — TELEPHONE ENCOUNTER
Controlled Substance Refill Request  Medication:   Requested Prescriptions      No prescriptions requested or ordered in this encounter     Date Last Fill: 9/12/19  Pharmacy:     [facility] #[id number] - [place], [place] - [address] [place] [address]  [phone]   Submit electronically to pharmacy  Controlled Substance Agreement on File:   Encounter-Level CSA Scan Date - 10/03/2017:    Scan on 10/6/2017 11:39 AM           Encounter-Level CSA Scan Date - 11/14/2016:    Scan on 11/14/2016       Last office visit: 3/18/2019 Oseas Foster MD

## 2021-06-02 NOTE — TELEPHONE ENCOUNTER
Left message for patient to call back and schedule for a sooner date with another provider if she is willing to.

## 2021-06-02 NOTE — TELEPHONE ENCOUNTER
Controlled Substance Refill Request  Medication:   Requested Prescriptions     Pending Prescriptions Disp Refills     dextroamphetamine-amphetamine (ADDERALL XR) 30 MG 24 hr capsule 15 capsule 0     Sig: Take 1 capsule (30 mg total) by mouth every morning.     Date Last Fill:   dextroamphetamine-amphetamine (ADDERALL XR) 30 MG 24 hr capsule 15 capsule 0 10/10/2019     [id number]    Submit electronically to pharmacy  Controlled Substance Agreement on File:   Encounter-Level CSA Scan Date - 10/03/2017:    Scan on 10/6/2017 11:39 AM           Encounter-Level CSA Scan Date - 11/14/2016:    Scan on 11/14/2016       Last office visit: Last office visit pertaining to requested medication was 3/18/19.

## 2021-06-02 NOTE — TELEPHONE ENCOUNTER
Medication:   Disp Refills Start End CAROLINA   predniSONE (DELTASONE) 10 mg tablet 24 tablet 1 10/9/2017       Sig: Take 3 tablets by mouth daily for 4 days, then take 2 tablets for 4 days, then 1 tablet for 4 days, then stop.  Pharmacy:[id number] - [place], [place] - [address] [facility] [address]    Last Office Visit:3/18/19    Last Refill:10/9/19    Quantity:24    Refills: 1  _______________________________________  Medication:   Disp Refills Start End CAROLINA   dextroamphetamine-amphetamine (ADDERALL XR) 30 MG 24 hr capsule 15 capsule 0 10/10/2019  No   Sig - Route: Take 1 capsule (30 mg total) by mouth every morning. - Oral     Pharmacy:[facility] #[id number] - [place], [place] - [address] [facility]    Last Office Visit:3/18/19    Last Refill:10/10/19    Quantity:15    Refills: 0

## 2021-06-02 NOTE — TELEPHONE ENCOUNTER
Schedule a Phone Visit for ADD or Office Visit.      I have refill the medication for 1 month.    If not seen or Phone Visit.  No Additional Refills    Needs a visit every 6 months.    Yearly Urine screen    CSA yearly.    Jing

## 2021-06-02 NOTE — TELEPHONE ENCOUNTER
Refill Approved    Rx renewed per Medication Renewal Policy. Medication was last renewed on 3/18/19 .    Alvina Eddy, [facility] Triage/Med Refill 10/20/2019     Requested Prescriptions   Pending Prescriptions Disp Refills     albuterol (PROAIR HFA;PROVENTIL HFA;VENTOLIN HFA) 90 mcg/actuation inhaler [Pharmacy Med Name: ALBUTEROL HFA INH (200 PUFFS) 8.5GM] 17 g 0     Sig: INHALE 2 PUFFS BY MOUTH EVERY 4 HOURS AS NEEDED       Albuterol/Levalbuterol Refill Protocol Passed - 10/20/2019 10:13 AM        Passed - PCP or prescribing provider visit in last year     Last office visit with prescriber/PCP: 3/18/2019 Oseas Foster MD OR same dept: 3/18/2019 Oseas Foster MD OR same specialty: 3/18/2019 Oseas Foster MD Last physical: 11/14/2016       Next appt within 3 mo: Visit date not found  Next physical within 3 mo: Visit date not found  Prescriber OR PCP: Oseas Foster MD  Last diagnosis associated with med order: 1. Bronchospasm  - albuterol (PROAIR HFA;PROVENTIL HFA;VENTOLIN HFA) 90 mcg/actuation inhaler [Pharmacy Med Name: ALBUTEROL HFA INH (200 PUFFS) 8.5GM]; INHALE 2 PUFFS BY MOUTH EVERY 4 HOURS AS NEEDED  Dispense: 17 g; Refill: 0    If protocol passes may refill for 6 months if within 3 months of last provider visit (or a total of 9 months). If patient requesting >1 inhaler per month refill x 6 months and have patient make appointment with provider.

## 2021-06-02 NOTE — TELEPHONE ENCOUNTER
Sent [employer] message to patient stating she is due for a medication check. Please assist patient in doing so if she calls back.     WIN PARSONS

## 2021-06-03 ENCOUNTER — COMMUNICATION - HEALTHEAST (OUTPATIENT)
Dept: PHARMACY | Facility: CLINIC | Age: 31
End: 2021-06-03

## 2021-06-03 DIAGNOSIS — F90.0 ATTENTION DEFICIT HYPERACTIVITY DISORDER (ADHD), PREDOMINANTLY INATTENTIVE TYPE: ICD-10-CM

## 2021-06-04 ENCOUNTER — COMMUNICATION - HEALTHEAST (OUTPATIENT)
Dept: SCHEDULING | Facility: CLINIC | Age: 31
End: 2021-06-04

## 2021-06-04 VITALS
DIASTOLIC BLOOD PRESSURE: 80 MMHG | BODY MASS INDEX: 43.59 KG/M2 | WEIGHT: 246 LBS | HEART RATE: 76 BPM | HEIGHT: 63 IN | SYSTOLIC BLOOD PRESSURE: 112 MMHG

## 2021-06-04 VITALS
DIASTOLIC BLOOD PRESSURE: 78 MMHG | BODY MASS INDEX: 43.61 KG/M2 | WEIGHT: 237 LBS | HEART RATE: 76 BPM | HEIGHT: 62 IN | SYSTOLIC BLOOD PRESSURE: 120 MMHG

## 2021-06-04 NOTE — PATIENT INSTRUCTIONS - HE
[email or web address]    He has read about and do the keto diet consistently for 3 months and come in to follow-up measure waist size  Do a beginning weight  Put on a pair of pants that you like and assure that you like any use this is a gauge of how your close feel    I would like you to consider switching to [place] Thyroid  This is activated T3 plus T4  I would start you at 15 g daily    I will do baseline labs today and I will send in a new prescription this would mean you discontinue the levothyroxine    I am trying to prior authorize Symbicort, Advair, Wixela, air duo as these are long-acting beta agonists along with a steroid  I will send in a prescription for Singulair 10 mg take this daily  No your triggers upper respiratory infections and allergies if you need to have a steroid taper use the my chart and we can do this as part of your asthma action plan    If you get another exam asthma exacerbation I like you to have a CT scan of your chest      You are always welcome to see a pulmonologist  Your is welcome to see an endocrinologist    For weight loss surgery this would be a gastric sleeve I would have you see Dr. Faisal Long at our [facility] or Dr Adriel Avila      These do your fasting labs in 1 week's time at a different clinic these need to be done after 8 hours of fasting

## 2021-06-04 NOTE — PROGRESS NOTES
ASSESSMENT & PLAN    Hypothyroidism  Hashimotos  ELevated TPO   Changed to [place] Thyroid 15 mg daily stop levothyroxine like labs today      Lucy was seen today for annual exam.    Diagnoses and all orders for this visit:    Well adult exam    Morbid obesity (H)    Moderate persistent asthma with exacerbation  -     budesonide-formoterol (SYMBICORT) 160-4.5 mcg/actuation inhaler; INHALE 2 PUFFS BY MOUTH TWICE DAILY  -     montelukast (SINGULAIR) 10 mg tablet; Take 1 tablet (10 mg total) by mouth daily.    Asthma with acute exacerbation, unspecified asthma severity, unspecified whether persistent    Perioral dermatitis  -     adapalene (DIFFERIN) 0.1 % cream; Apply to affected area nightly    Hypothyroidism, unspecified type  -     Celiac(Gluten)Antibody Panel  -     Thyroid Stimulating Hormone (TSH)  -     T4, Free  -     T3, Total  -     thyroid, pork, 15 mg Tab; 1 daily on empty stomach  And recheck thyroid levels in 3 months March 1, 2020    Class 3 severe obesity with serious comorbidity and body mass index (BMI) of 40.0 to 44.9 in adult, unspecified obesity type (H)  -     Insulin Assay; Future  -     Cortisol; Future    Stretch marks  -     Insulin Assay; Future  -     Cortisol; Future    Acne, unspecified acne type  -     Insulin Assay; Future  -     Cortisol; Future        Patient Instructions   [email or web address]    He has read about and do the keto diet consistently for 3 months and come in to follow-up measure waist size  Do a beginning weight  Put on a pair of pants that you like and assure that you like any use this is a gauge of how your close feel    I would like you to consider switching to [place] Thyroid  This is activated T3 plus T4  I would start you at 15 g daily    I will do baseline labs today and I will send in a new prescription this would mean you discontinue the levothyroxine    I am trying to prior authorize Symbicort, Advair, Wixela, air duo as these are  long-acting beta agonists along with a steroid  I will send in a prescription for Singulair 10 mg take this daily  No your triggers upper respiratory infections and allergies if you need to have a steroid taper use the my chart and we can do this as part of your asthma action plan    If you get another exam asthma exacerbation I like you to have a CT scan of your chest      You are always welcome to see a pulmonologist  Your is welcome to see an endocrinologist    For weight loss surgery this would be a gastric sleeve I would have you see Dr. Faisal Long at our [facility] surgical center or Dr Adriel Avila      These do your fasting labs in 1 week's time at a different clinic these need to be done after 8 hours of fasting      Return in about 3 months (around 3/5/2020).       Little interest or pleasure in doing things: Not at all  Feeling down, depressed, or hopeless: Not at all  Trouble falling or staying asleep, or sleeping too much: Several days  Feeling tired or having little energy: Several days  Poor appetite or overeating: Several days  Feeling bad about yourself - or that you are a failure or have let yourself or your family down: Not at all  Trouble concentrating on things, such as reading the newspaper or watching television: Several days  Moving or speaking so slowly that other people could have noticed. Or the opposite - being so fidgety or restless that you have been moving around a lot more than usual: Not at all  Thoughts that you would be better off dead, or of hurting yourself in some way: Not at all  PHQ-9 Total Score: 4  If you checked off any problems, how difficult have these problems made it for you to do your work, take care of things at home, or get along with other people?: Not difficult at all    CHIEF COMPLAINT: Lucy Kaur had concerns including Annual Exam (Non-fasting).    [place]: 1.............. had concerns including Annual Exam (Non-fasting).    1. Well adult exam    2.  Morbid obesity (H)    3. Moderate persistent asthma with exacerbation    4. Asthma with acute exacerbation, unspecified asthma severity, unspecified whether persistent    5. Perioral dermatitis    6. Hypothyroidism, unspecified type    7. Class 3 severe obesity with serious comorbidity and body mass index (BMI) of 40.0 to 44.9 in adult, unspecified obesity type (H)    8. Stretch marks    9. Acne, unspecified acne type          CC:             Why are you here today?                             Recent worsening asthma    Seen in the hospital in October November  Reviewed  Patient developed a cold worsening symptoms felt a squeezing in her chest could not breathe  Felt like her chest was concave and  Did respond to albuterol but just got worse and worse and had to do 2 courses of prednisone    Currently on levothyroxine at 100 mcg daily    Due for labs    Is struggling with weight gain  Put on a lot of weight in her trunk area  Does have some stretch marks  Does have some acneiform changes could be because of recent steroids    We talked about a ketogenic diet as well as possible alternative such as weight loss surgery                    SUBJECTIVE:  Lucy Kaur is a 29 y.o. female                                SOCIAL: She  reports that she has quit smoking. She smoked 0.25 packs per day. She has never used smokeless tobacco. She reports that she does not drink alcohol or use drugs.    REVIEW OF SYSTEMS:   Family history not pertinent to chief complaint or presenting problem    Review of Systems:      Nervous System:  No new or change in headache, paresthesia or tremor                                  Ears: No new hearing loss or ringing in the ears    Eyes: No new blurring of vision, Double Vision                Nose: No new nosebleed or loss of smell    Mouth: No new mouth sores or  coated tongue    Throat: No new hoarseness or difficulty swallowing    Neck: No new neck pain or mass    Heart: No new chest  "pain, palpitation or irregular heartbeat.                  Lungs: Recent worsening asthma squeezing in the chest wheezing    Gastrointestinal: No new nausea or vomiting, melena or blood in stools.    Kidney/Bladder: No new polyuria, polydipsia, or hematuria.                             Genital/Sexual: No new Sex function Changes                                Skin: Acne changes of the face    Muscles/Joints/Bones: No changes in muscles / joint swelling     Review of systems otherwise negative as requested from patient, except   Those positive ROS outlined and discussed in [place].      VITALS:      Physical Exam:  Sclera clear and acneform changes around the chin in the perioral region  Lungs are clear  Cardiac arrest regular rate and rhythm no appreciable murmur  Thyroid prep nontender without nodules  Calves are supple  No edema  Stretch marks of the abdomen  Waist hip ratio greater than 1  Recent Results (from the past 240 hour(s))   Celiac(Gluten)Antibody Panel   Result Value Ref Range    Gliadin IgA 4.4 0.0-<7.0 U/mL    Gliadin IgG 1.0 0.0-<7.0 U/mL    Tissue Transglutaminase IgG AB <0.6 0.0-<7.0 U/mL    Tissue Transglutaminase IgA AB 0.2 0.0-<7.0 U/mL    Immunoglobulin A 155 65 - 400 mg/dL   Thyroid Stimulating Hormone (TSH)   Result Value Ref Range    TSH 7.73 (H) 0.30 - 5.00 uIU/mL   T4, Free   Result Value Ref Range    Free T4 0.8 0.7 - 1.8 ng/dL   T3, Total   Result Value Ref Range    T3, Total 67 45 - 175 ng/dL   Insulin Assay   Result Value Ref Range    Insulin, Serum 11.4 3 - 25 mU/L   Cortisol   Result Value Ref Range    Cortisol 3.6 ug/dL           Vitals:    12/05/19 1254   BP: 112/80   Patient Site: Right Arm   Patient Position: Sitting   Cuff Size: Adult Large   Pulse: 76   Weight: (!) 246 lb (111.6 kg)   Height: 5' 2.91\" (1.598 m)     Wt Readings from Last 3 Encounters:   12/05/19 (!) 246 lb (111.6 kg)   11/30/19 (!) 230 lb (104.3 kg)   03/18/19 (!) 226 lb (102.5 kg)     Body mass index is 43.7 " kg/m .    PFSH:    Social History     Tobacco Use   Smoking Status Former Smoker     Packs/day: 0.25   Smokeless Tobacco Never Used   Tobacco Comment    quit 6 months ago       Family History   Problem Relation Age of Onset     Mental illness Mother      Asthma Father        Social History     Socioeconomic History     Marital status: Single     Spouse name: Not on file     Number of children: Not on file     Years of education: Not on file     Highest education level: Not on file   Occupational History     Not on file   Social Needs     Financial resource strain: Not on file     Food insecurity:     Worry: Not on file     Inability: Not on file     Transportation needs:     Medical: Not on file     Non-medical: Not on file   Tobacco Use     Smoking status: Former Smoker     Packs/day: 0.25     Smokeless tobacco: Never Used     Tobacco comment: quit 6 months ago   Substance and Sexual Activity     Alcohol use: No     Drug use: No     Sexual activity: Yes     Partners: Male     Birth control/protection: None, I.U.D.   Lifestyle     Physical activity:     Days per week: Not on file     Minutes per session: Not on file     Stress: Not on file   Relationships     Social connections:     Talks on phone: Not on file     Gets together: Not on file     Attends Alevism service: Not on file     Active member of club or organization: Not on file     Attends meetings of clubs or organizations: Not on file     Relationship status: Not on file     Intimate partner violence:     Fear of current or ex partner: Not on file     Emotionally abused: Not on file     Physically abused: Not on file     Forced sexual activity: Not on file   Other Topics Concern     Not on file   Social History Narrative     Not on file       Past Surgical History:   Procedure Laterality Date     FOOT SURGERY Bilateral      KS REMOVAL GALLBLADDER      Description: Cholecystectomy;  Recorded: 05/26/2011;     TONSILLECTOMY AND ADENOIDECTOMY      AGE 8         No Known Allergies    Active Ambulatory Problems     Diagnosis Date Noted     Hypothyroidism  Hashimotos  ELevated TPO       Anxiety Disorder NOS      Abnormal Pap smear of cervix      ADHD, Predominantly Inattentive Type      Asthma      Arthralgias In Multiple Sites      Difficulty Breathing (Dyspnea)      Allergies      Chronic Major Depression      Obesity      Carpal tunnel syndrome on both sides 03/16/2015     IUD contraception 03/18/2019     Morbid obesity (H) 12/05/2019     Resolved Ambulatory Problems     Diagnosis Date Noted     Acute pharyngitis      Pain During Urination (Dysuria)      Nicotine Dependence      Speech Phonation Hoarse      No Additional Past Medical History         MEDICATIONS:  Current Outpatient Medications   Medication Sig Dispense Refill     albuterol (PROAIR HFA;PROVENTIL HFA;VENTOLIN HFA) 90 mcg/actuation inhaler Inhale 2 puffs every 4 (four) hours as needed for wheezing. 1 Inhaler 0     albuterol (PROVENTIL) 2.5 mg /3 mL (0.083 %) nebulizer solution Take 3 mL (2.5 mg total) by nebulization every 6 (six) hours as needed for wheezing. 75 mL 0     budesonide-formoterol (SYMBICORT) 160-4.5 mcg/actuation inhaler INHALE 2 PUFFS BY MOUTH TWICE DAILY 3 Inhaler 3     cetirizine (ZYRTEC) 10 MG tablet Take 10 mg by mouth daily.       fluticasone (FLONASE) 50 mcg/actuation nasal spray 1-2 sprays in each nostril at bedtime. 16 g 0     fluticasone propionate (FLOVENT HFA) 110 mcg/actuation inhaler Inhale 1 puff 2 (two) times a day. 1 Inhaler 0     ibuprofen (ADVIL,MOTRIN) 600 MG tablet Take 1 tablet (600 mg total) by mouth 3 (three) times a day. 270 tablet 0     nebulizer accessories Misc Adult mask kit. Use as directed       adapalene (DIFFERIN) 0.1 % cream Apply to affected area nightly 45 g 1     dextroamphetamine-amphetamine (ADDERALL XR) 30 MG 24 hr capsule Take 1 capsule (30 mg total) by mouth every morning. 30 capsule 0     [START ON 1/8/2020] dextroamphetamine-amphetamine (ADDERALL  XR) 30 MG 24 hr capsule Take 1 capsule (30 mg total) by mouth every morning. 30 capsule 0     montelukast (SINGULAIR) 10 mg tablet Take 1 tablet (10 mg total) by mouth daily. 90 tablet 3     thyroid, pork, 15 mg Tab 1 daily on empty stomach  And recheck thyroid levels in 3 months March 1, 2020 90 tablet 0     No current facility-administered medications for this visit.               I spent 30  minutes with this patient face to face, of which 50% or greater was spent in counseling and coordination of care with regards to Lucy was seen today for annual exam.    Diagnoses and all orders for this visit:    Well adult exam    Morbid obesity (H)    Moderate persistent asthma with exacerbation  -     budesonide-formoterol (SYMBICORT) 160-4.5 mcg/actuation inhaler; INHALE 2 PUFFS BY MOUTH TWICE DAILY  -     montelukast (SINGULAIR) 10 mg tablet; Take 1 tablet (10 mg total) by mouth daily.    Asthma with acute exacerbation, unspecified asthma severity, unspecified whether persistent    Perioral dermatitis  -     adapalene (DIFFERIN) 0.1 % cream; Apply to affected area nightly    Hypothyroidism, unspecified type  -     Celiac(Gluten)Antibody Panel  -     Thyroid Stimulating Hormone (TSH)  -     T4, Free  -     T3, Total  -     thyroid, pork, 15 mg Tab; 1 daily on empty stomach  And recheck thyroid levels in 3 months March 1, 2020    Class 3 severe obesity with serious comorbidity and body mass index (BMI) of 40.0 to 44.9 in adult, unspecified obesity type (H)  -     Insulin Assay; Future  -     Cortisol; Future    Stretch marks  -     Insulin Assay; Future  -     Cortisol; Future    Acne, unspecified acne type  -     Insulin Assay; Future  -     Cortisol; Future        Oseas Foster MD  Family Medicine   [facility] [phone]

## 2021-06-06 NOTE — TELEPHONE ENCOUNTER
----- Message from Oseas Foster MD sent at 3/13/2020 12:17 PM CDT -----  Clarify  Start 30 mg [place] daily empty stomach  Recheck in 3 months Same labs   My suspicion is you will need 45-60 mg   DanL

## 2021-06-06 NOTE — PATIENT INSTRUCTIONS - HE
[email or web address]  For coffee    8 hour eating window  16 hour fast window    Will plan to go to 30 mg [place]     Adderall sent       Instructions for Patients  Additional General Information About Corona Virus 19    Coronavirus - General Information:  The coronavirus infection starts within 14 days of an exposure.  Symptoms are those of a respiratory infection (such as fever, cough).   If you have not had symptoms by day 15, you should be safe from getting the coronavirus.     Coronavirus - Symptoms:   The coronavirus can cause a respiratory illness, such as bronchitis or pneumonia.  The most common symptoms are: cough, fever, and shortness of breath.   Other symptoms are: body aches, chills, diarrhea, fatigue, headache, runny nose, and sore throat     Coronavirus - Exposure Risk Factors:  Exposure to a person who has been diagnosed with coronavirus.  Travel from an area with recent local transmission of coronavirus.  The CDC (www.cdc.gov) has the most up-to-date list of where the coronavirus outbreak is occurring.    Coronavirus - Spreading:   The virus likely spreads through respiratory droplets produced when a person coughs or sneezes. These respiratory droplets can travel approximately 6 feet and can remain on surfaces.  Common disinfectants will kill the virus.  The CDC currently does not recommend healthy people wearing masks.    Coronavirus - Protect Yourself:   Avoid close contact with people known to have this new coronavirus infection.  Wash hands often with soap and water or alcohol-based hand .  Avoid touching the eyes, nose or mouth.     Thank you for limiting contact with others, wearing a simple mask to cover your cough, practice good hand hygiene habits and accessing our [facility] services where possible to limit the spread of this virus.    For more information about COVID19 and options for caring for yourself at home, please visit the CDC website at  https://www.cdc.gov/coronavirus/2019-ncov/about/steps-when-sick.html  For more options for care at [facility], please visit our website at [email or web address]     Read about corona Updates  Prepare by being informed  Stay safe and well    Jing

## 2021-06-06 NOTE — TELEPHONE ENCOUNTER
Controlled Substance Refill Request  Medication Name:   Requested Prescriptions     Pending Prescriptions Disp Refills     dextroamphetamine-amphetamine (ADDERALL XR) 30 MG 24 hr capsule 30 capsule 0     Sig: Take 1 capsule (30 mg total) by mouth every morning.     Date Last Fill: 1/8/20  Is patient out of medication?:  Yes  Patient notified refills processed within 3 business days:  Yes  [facility]ed [facility]acy: [facility]  Submit electronically to pharmacy  Controlled Substance Agreement on file:   Encounter-Level CSA Scan Date - 10/03/2017:    Scan on 10/6/2017 11:39 AM           Encounter-Level CSA Scan Date - 11/14/2016:    Scan on 11/14/2016        Last office visit:  12/5/19

## 2021-06-06 NOTE — TELEPHONE ENCOUNTER
Medication: dextroamphetamine-amphetamine (ADDERALL XR) 30 MG 24 hr capsule  Pharmacy: [facility] in [place]   Last OV: 12/5/19 with Dr. Foster  Last refill: 1/8/20, #30 with 0 refills authorized by Dr. Foster.      KLP, CMA

## 2021-06-07 NOTE — TELEPHONE ENCOUNTER
Relayed MD's lab result notes to pt, and there were no further questions. Pt will call back to set up Lab only visit for labs.    BRWIN

## 2021-06-07 NOTE — TELEPHONE ENCOUNTER
Medication Request  Medication name: ALBUTEROL HFA INH (200 PUFFS) 8.5GM  Requested Pharmacy: [facility]  Reason for request: Electronic request  When did you use medication last?:  N/A  Patient offered appointment:  N/A - electronic request  Okay to leave a detailed message: no

## 2021-06-07 NOTE — TELEPHONE ENCOUNTER
----- Message from Oseas Foster MD sent at 3/13/2020 12:17 PM CDT -----  Clarify  Start 30 mg [place] daily empty stomach  Recheck in 3 months Same labs   My suspicion is you will need 45-60 mg   DanL

## 2021-06-07 NOTE — TELEPHONE ENCOUNTER
Fever 101.2, runny nose, watery nose, nasal congestion, watery eyes, nurse recommends stay home.    Triaged to a disposition of Home Care. Home care given per guideline.    CALL BACK IF:  *  Fever goes above 105 F (40.6 C)   *  Any fever occurs if under 12 weeks old   *  Fever without a cause persists over 24 hours (if age less than 2 years)  *  Fever persists over 3 days (72 hours)  *  If child is not taking in adequate fluids, not as many wet diapers  *  Your child becomes worse    Danielle Eric RN [facility] Triage Nurse Advisor      Reason for Disposition    Fever with no signs of serious infection or localizing symptoms    Protocols used: FEVER-A-OH

## 2021-06-07 NOTE — TELEPHONE ENCOUNTER
Needs [facility] Referral   SHe is asthmatic   Use INhalers over neb if possible      Instructions for Patients  Additional General Information About Corona Virus 19    Coronavirus - General Information:  The coronavirus infection starts within 14 days of an exposure.  Symptoms are those of a respiratory infection (such as fever, cough).   If you have not had symptoms by day 15, you should be safe from getting the coronavirus.     Coronavirus - Symptoms:   The coronavirus can cause a respiratory illness, such as bronchitis or pneumonia.  The most common symptoms are: cough, fever, and shortness of breath.   Other symptoms are: body aches, chills, diarrhea, fatigue, headache, runny nose, and sore throat     Coronavirus - Exposure Risk Factors:  Exposure to a person who has been diagnosed with coronavirus.  Travel from an area with recent local transmission of coronavirus.  The CDC (www.cdc.gov) has the most up-to-date list of where the coronavirus outbreak is occurring.    Coronavirus - Spreading:   The virus likely spreads through respiratory droplets produced when a person coughs or sneezes. These respiratory droplets can travel approximately 6 feet and can remain on surfaces.  Common disinfectants will kill the virus.  The CDC currently does not recommend healthy people wearing masks.    Coronavirus - Protect Yourself:   Avoid close contact with people known to have this new coronavirus infection.  Wash hands often with soap and water or alcohol-based hand .  Avoid touching the eyes, nose or mouth.     Thank you for limiting contact with others, wearing a simple mask to cover your cough, practice good hand hygiene habits and accessing our virtual services where possible to limit the spread of this virus.    For more information about COVID19 and options for caring for yourself at home, please visit the CDC website at https://www.cdc.gov/coronavirus/2019-ncov/about/steps-when-sick.html  For more options for  care at [facility], please visit our website at [email or web address]         We are directing patients to [facility] if they have any ill symptoms - cough, fever  1. Go to the website [email or web address]   2. Create an account (you will need your insurance information)   3. Start a new visit   4. Choose your diagnosis (e.g. COVID19)   5. Fill out the information about your symptoms   6. A provider will reach out to you by text, phone call or video visit based on your request     While you are at home please follow these instructions to care for yourself:     Isolate Yourself:   Isolate yourself at home.   Do Not allow any visitors   Do Not go to work or school   Do Not go to [facility],  centers, shopping, or other public places.   Do Not shake hands.   Avoid close contact with others (hugging, kissing).     Protect Others:   Cover Your Mouth and Nose with a mask, disposable tissue or wash cloth to avoid spreading germs to others.   Wash your hands and face frequently with soap and water.     Fever Medicines:   For fever relief, take acetaminophen or ibuprofen.   Treat fevers above 101  F (38.3  C) to lower fevers and make you more comfortable.   Acetaminophen (e.g., Tylenol): Take 650 mg (two 325 mg pills) by mouth every 4-6 hours as needed of regular strength Tyleno or 1,000 mg (two 500 mg pills) every 8 hours as needed of Extra Strength Tylenol.     Acetaminophen is thought to be safer than ibuprofen or naproxen for people over 65 years old. Acetaminophen is in many OTC and prescription medicines. It might be in more than one medicine that you are taking. You need to be careful and not take an overdose. Before taking any medicine, read all the instructions on the package.   Caution -NSAIDs (e.g., ibuprofen, naproxen): Do not take nonsteroidal anti-inflammatory drugs (NSAIDs) if you have stomach problems, kidney disease, heart failure, or other contraindications to using this  type of medicine. Do not take NSAID medicines for over 7 days without consulting your PCP. Do not take NSAID medicines if you are pregnant. Do not take NSAID medicines if you are also taking blood thinners. There is some data that Ibuprofen and Aleve may make Covi-19 worse as the data coming out of [place].    Call Back If: Breathing difficulty develops or you become worse.     Thank you for limiting contact with others, wearing a simple mask to cover your cough, practice good hand hygiene habits and accessing our virtual services where possible to limit the spread of this virus.     For more information about COVID19 and options for caring for yourself at home, please visit the CDC website at https://www.cdc.gov/coronavirus/2019-ncov/about/steps-when-sick.html   For more options for care at [facility], please visit our website at [email or web address]    Jing

## 2021-06-08 NOTE — TELEPHONE ENCOUNTER
Controlled Substance Refill Request  Medication Name:   Requested Prescriptions     Pending Prescriptions Disp Refills     dextroamphetamine-amphetamine (ADDERALL XR) 30 MG 24 hr capsule 30 capsule 0     Sig: Take 1 capsule (30 mg total) by mouth every morning.     Date Last Fill: 3/12/2020  [facility]ed [facility]  Submit electronically to pharmacy  Controlled Substance Agreement on file:   Encounter-Level CSA Scan Date - 10/03/2017:    Scan on 10/6/2017 11:39 AM           Encounter-Level CSA Scan Date - 11/14/2016:    Scan on 11/14/2016        Last office visit:  3/12/2020

## 2021-06-08 NOTE — PROGRESS NOTES
"ASSESSMENT:   1. Bacterial sinusitis  fluticasone (FLONASE) 50 mcg/actuation nasal spray    amoxicillin-clavulanate (AUGMENTIN) 875-125 mg per tablet           Exam and history most suggestive of bacterial sinusitis given duration of symptoms and no improvement. No signs of intracranial mass, hemorrhage, venous sinus thrombosis, or any other more severe etiology of symptoms today. I will treat with Augmentin.    At the end of the encounter, I discussed results, diagnosis, medications. Discussed red flags for immediate return to clinic/ER, as well as indications for follow up if no improvement. Patient understood and agreed to plan. Patient was stable for discharge.      PLAN:  Your symptoms are most likely due to a sinus infection. I will send a prescription for Augmentin to your pharmacy. Please take as directed for 10 days. Take with food. Use a probiotic. You could take this every day, even when you're not sick.     Use Flonase 2 sprays in each nostril at bedtime.    You could also try a neti-pot to help with congestion. You can try Mucinex to help loosen the mucous. You can also use ibuprofen to help decrease inflammation.    If you develop fevers, trouble breathing, or any new, concerning symptoms, return immediately for re-evaluation. Otherwise, follow up with primary care if not getting better in 10 days.            SUBJECTIVE:   Lucy Kaur is a 26 y.o. female with a history significant for asthma, who presents today for evaluation of sinus pressure for the last month. She complains of \"complete congestion,\" ear pain, and purulent rhinorrhea. No fevers. No cough. No blurry vision, tinnitus, poor balance, confusion, neck stiffness. She denies a history of frequent sinusitis. No increased albuterol use. She traveled to [place] last week. No known ill contacts.    Past Medical History:  Patient Active Problem List   Diagnosis     Hypothyroidism     Anxiety Disorder NOS     Abnormal Pap smear of cervix "     Nicotine Dependence     ADHD, Predominantly Inattentive Type     Asthma     Arthralgias In Multiple Sites     Difficulty Breathing (Dyspnea)     Allergies     Chronic Major Depression     Speech Phonation Hoarse     Obesity     Carpal tunnel syndrome on both sides       Surgical History:  Tonsillectomy and adenoidectomy at age 8  Otherwise non-contributory      Family History:  Family History   Problem Relation Age of Onset     Mental illness Mother      Asthma Father      Reviewed; Non-contributory      Social History:    History   Smoking Status     Current Every Day Smoker     Packs/day: 0.25   Smokeless Tobacco     Never Used     Smoking: E-cigs  Alcohol use: occasionally  Other drug use: none  Occupation: machine operating job        Current Medications:  Current Outpatient Prescriptions on File Prior to Visit   Medication Sig Dispense Refill     albuterol (PROAIR HFA) 90 mcg/actuation inhaler Inhale 2 puffs every 4 (four) hours as needed for wheezing. 2 Inhaler 12     cetirizine (ZYRTEC) 10 MG tablet Take 10 mg by mouth daily.       dextroamphetamine-amphetamine (ADDERALL XR) 30 MG 24 hr capsule Take 1 capsule (30 mg total) by mouth every morning. 30 capsule 0     hydrocortisone (WESTCORT) 0.2 % cream 2 (two) times a day. Apply sparingly to affected area (s)       ibuprofen (ADVIL,MOTRIN) 600 MG tablet Take 1 tablet (600 mg total) by mouth 3 (three) times a day. 270 tablet 0     lansoprazole (PREVACID) 30 MG capsule TAKE 1 CAPSULE BY MOUTH DAILY 30 capsule 3     levothyroxine (SYNTHROID, LEVOTHROID) 75 MCG tablet Take 1 tablet (75 mcg total) by mouth daily. 90 tablet 3     melatonin 5 mg Tab 2 at bedtime 60 each 5     nebulizer accessories Misc Adult mask kit. Use as directed       nicotine polacrilex (NICORETTE) 4 MG gum APPLY 1 PIECE TO THE MOUTH OR THROAT AS NEEDED FOR SMOKING CESSATION (MAX 24/DAY) 220 each 1     SYMBICORT 160-4.5 mcg/actuation inhaler INHALE 2 PUFFS BY MOUTH TWICE DAILY 6 Inhaler 0      [DISCONTINUED] azithromycin (ZITHROMAX Z-KANDY) 250 MG tablet Take 4 tablets now and repeat in 1 week 8 tablet 1     [DISCONTINUED] dextroamphetamine-amphetamine (ADDERALL XR) 30 MG 24 hr capsule Take 1 capsule (30 mg total) by mouth every morning. 30 capsule 0     No current facility-administered medications on file prior to visit.        Allergies:   No Known Allergies    I personally reviewed patient's past medical, surgical, social, family history and allergies.    ROS:  Review of Systems  See HPI      OBJECTIVE:   Visit Vitals     /60     Pulse 72     Temp 98.1  F (36.7  C) (Oral)     Wt 217 lb 9.6 oz (98.7 kg)     SpO2 100%     BMI 38.55 kg/m2         General Appearance:  Alert, well-appearing female in NAD. Afebrile. Voice sounds very congested.   HEENT:  Head: Atraumatic, normocephalic. Face nontraumatic.  Eyes: Conjunctiva clear, Lids normal.  Ears:  TMs dull bilaterally. No canal erythema or edema.  Nose: nares patent. Mild edema and erythema of nasal mucosa. No rhinorrhea. + maxillary sinus tenderness and frontal sinus tenderness.  Oropharynx:  No trismus. No posterior pharyngeal erythema or exudate. No tonsillar hypertrophy. Uvula midline. Moist mucus membranes.  Neck: Supple, no lymphadenopathy. No meningismus.  Respiratory: No distress. Lungs clear to ausculation bilaterally. No crackles, wheezes, rhonchi or stridor.  Cardiovascular: Regular rate and rhythm, no murmur, rub or gallop. No obvious chest wall deformities.   Neurologic: Alert and orientated appropriately. No focal deficits. Facial movements symmetric.

## 2021-06-08 NOTE — TELEPHONE ENCOUNTER
Medication: dextroamphetamine-amphetamine (ADDERALL XR) 30 MG 24 hr capsule   Pharmacy: [facility] on [address]    Last OV: 3/12/20 with Dr. Foster  Last refill: 5/6/20, #30 with 0 refills authorized by Dr. Foster     [facility]

## 2021-06-08 NOTE — TELEPHONE ENCOUNTER
Refill Approved    Rx renewed per Medication Renewal Policy. Medication was last renewed on 10/28/19.    Daphne Pickett, [facility] Triage/Med Refill 5/8/2020     Requested Prescriptions   Pending Prescriptions Disp Refills     albuterol (PROVENTIL) 2.5 mg /3 mL (0.083 %) nebulizer solution [Pharmacy Med Name: ALBUTEROL 0.083%(2.5MG/3ML) 25X3ML] 225 mL 0     Sig: USE 1 VIAL VIA NEBULIZER EVERY 4 HOURS AS NEEDED FOR WHEEZING       Albuterol/Levalbuterol Refill Protocol Passed - 5/6/2020  5:53 PM        Passed - PCP or prescribing provider visit in last year     Last office visit with prescriber/PCP: 3/12/2020 Oseas Foster MD OR same dept: 3/12/2020 Oseas Foster MD OR same specialty: 3/12/2020 Oseas Foster MD Last physical: 12/5/2019       Next appt within 3 mo: Visit date not found  Next physical within 3 mo: Visit date not found  Prescriber OR PCP: Oseas Foster MD  Last diagnosis associated with med order: 1. Moderate asthma with exacerbation, unspecified whether persistent  - albuterol (PROVENTIL) 2.5 mg /3 mL (0.083 %) nebulizer solution [Pharmacy Med Name: ALBUTEROL 0.083%(2.5MG/3ML) 25X3ML]; USE 1 VIAL VIA NEBULIZER EVERY 4 HOURS AS NEEDED FOR WHEEZING  Dispense: 225 mL; Refill: 0    If protocol passes may refill for 6 months if within 3 months of last provider visit (or a total of 9 months). If patient requesting >1 inhaler per month refill x 6 months and have patient make appointment with provider.

## 2021-06-08 NOTE — TELEPHONE ENCOUNTER
Refill Approved    Rx renewed per Medication Renewal Policy. Medication was last renewed on 5/6/20.    Daphne Pickett, [facility] Triage/Med Refill 6/9/2020     Requested Prescriptions   Pending Prescriptions Disp Refills     albuterol (PROAIR HFA;PROVENTIL HFA;VENTOLIN HFA) 90 mcg/actuation inhaler [Pharmacy Med Name: ALBUTEROL HFA INH (200 PUFFS) 8.5GM] 17 g 0     Sig: INHALE 2 PUFFS BY MOUTH EVERY 4 HOURS AS NEEDED       Albuterol/Levalbuterol Refill Protocol Passed - 6/7/2020 10:40 AM        Passed - PCP or prescribing provider visit in last year     Last office visit with prescriber/PCP: 3/12/2020 Oseas Foster MD OR same dept: 3/12/2020 Oseas Foster MD OR same specialty: 3/12/2020 Oseas Foster MD Last physical: 12/5/2019       Next appt within 3 mo: Visit date not found  Next physical within 3 mo: Visit date not found  Prescriber OR PCP: Oseas Foster MD  Last diagnosis associated with med order: 1. Moderate asthma with exacerbation, unspecified whether persistent  - albuterol (PROAIR HFA;PROVENTIL HFA;VENTOLIN HFA) 90 mcg/actuation inhaler [Pharmacy Med Name: ALBUTEROL HFA INH (200 PUFFS) 8.5GM]; INHALE 2 PUFFS BY MOUTH EVERY 4 HOURS AS NEEDED  Dispense: 17 g; Refill: 0    If protocol passes may refill for 6 months if within 3 months of last provider visit (or a total of 9 months). If patient requesting >1 inhaler per month refill x 6 months and have patient make appointment with provider.

## 2021-06-10 NOTE — TELEPHONE ENCOUNTER
Who is calling:  Patient   Reason for Call:  Patient is calling in for follow up on refill request below patient states she requested on 07/16/20 its been over a week and she is out of medication for couple of days requesting to process as soon as possible today.  Date of last appointment with primary care: 0312/20  Okay to leave a detailed message: No

## 2021-06-10 NOTE — TELEPHONE ENCOUNTER
Medication:   Disp  Refills  Start  End  CAROLINA    dextroamphetamine-amphetamine (ADDERALL XR) 30 MG 24 hr capsule  30 capsule  0  6/8/2020   No    Sig - Route: Take 1 capsule (30 mg total) by mouth every morning. - Oral        Pharmacy:[id number] - [place], [place] - [address] [facility]     Last Office Visit:  3/12/2020

## 2021-06-11 NOTE — TELEPHONE ENCOUNTER
1 refill given. Due for follow up visitbefore further refills - please call her to make an appt with Dr. Foster- may be virtual

## 2021-06-11 NOTE — TELEPHONE ENCOUNTER
[employer] message sent. Patient has not answered her phone for scheduling.    WIN Sexton

## 2021-06-11 NOTE — PROGRESS NOTES
"    Assessment and Plan    1. Screening for STD (sexually transmitted disease)  - Chlamydia trachomatis & Neisseria gonorrhoeae, Amplified Detection  - HIV Antigen/Antibody Screening Velva  - Syphilis Screen, Cascade    2. Positive Chlamyida test  - Chlamydia trachomatis & Neisseria gonorrhoeae, Amplified Detection    3. Family planning  - norgestimate-ethinyl estradiol (ORTHO TRI-CYCLEN) 0.18/0.215/0.25 mg-35 mcg (28) Tab tablet; Take 1 tablet by mouth daily.  Dispense: 84 tablet; Refill: 4    4. Asthma  Well controlled on Symbicort        Ruby Almonte MD     -------------------------------------------    Chief Complaint   Patient presents with     std CEHCK     Contraception     WANTS TO GO ON OCP     STD check: Leigh says her last partner was a \"massive cheater.\" Last time check for chlamydia was positive and she took azithromycin  Weeks ago - has not had a test of cure.  She would like to be checked for other STDs as well  - she is beginning a new relationship. She has No pelvic pain, fever, nausea or discharge.      Also regarding this new relationship, she would like to start family planning.  She has two kids age 8 and 10.  She would like to use Oral contraceptives as these have worked well for her before and she remembers to take them.   She tried nuvaring, mirena - \"odd effects\"     Asthma - 6 months since she stopped smoking - generally better - harder when allergies - worse warmer weather . She takes Symbicort daily. She works nights, and is at home in the  when it is hottest  ACT score 20 today    Patient Active Problem List   Diagnosis     Hypothyroidism     Anxiety Disorder NOS     Abnormal Pap smear of cervix     Nicotine Dependence     ADHD, Predominantly Inattentive Type     Asthma     Arthralgias In Multiple Sites     Difficulty Breathing (Dyspnea)     Allergies     Chronic Major Depression     Speech Phonation Hoarse     Obesity     Carpal tunnel syndrome on both sides         Current " Outpatient Prescriptions:      cetirizine (ZYRTEC) 10 MG tablet, Take 10 mg by mouth daily., Disp: , Rfl:      clindamycin-benzoyl peroxide (BENZACLIN) gel, APPLY BID TO FACE FOR ACNE, Disp: 50 g, Rfl: 0     dextroamphetamine-amphetamine (ADDERALL XR) 30 MG 24 hr capsule, Take 1 capsule (30 mg total) by mouth every morning., Disp: 30 capsule, Rfl: 0     fluticasone (FLONASE) 50 mcg/actuation nasal spray, 1-2 sprays in each nostril at bedtime., Disp: 16 g, Rfl: 0     ibuprofen (ADVIL,MOTRIN) 600 MG tablet, Take 1 tablet (600 mg total) by mouth 3 (three) times a day., Disp: 270 tablet, Rfl: 0     lansoprazole (PREVACID) 30 MG capsule, TAKE 1 CAPSULE BY MOUTH DAILY (Patient taking differently: TAKE 1 CAPSULE BY MOUTH DAILY PRN), Disp: 30 capsule, Rfl: 3     melatonin 5 mg Tab, 2 at bedtime (Patient taking differently: at bedtime as needed. 2 at bedtime), Disp: 60 each, Rfl: 5     nebulizer accessories Misc, Adult mask kit. Use as directed, Disp: , Rfl:      nicotine polacrilex (NICORETTE) 4 MG gum, USE 1 PIECE TO MOUTH AS NEEDED FOR SMOKING CESSATION. MAX 24 PIECES DAILY, Disp: 170 each, Rfl: 2     PROAIR HFA 90 mcg/actuation inhaler, INHALE 2 PUFFS BY MOUTH EVERY 4 HOURS AS NEEDED, Disp: 3 Inhaler, Rfl: 0     SYMBICORT 160-4.5 mcg/actuation inhaler, INHALE 2 PUFFS BY MOUTH TWICE DAILY, Disp: 6 Inhaler, Rfl: 0     levothyroxine (SYNTHROID, LEVOTHROID) 75 MCG tablet, Take 1 tablet (75 mcg total) by mouth daily., Disp: 90 tablet, Rfl: 3     norgestimate-ethinyl estradiol (ORTHO TRI-CYCLEN) 0.18/0.215/0.25 mg-35 mcg (28) Tab tablet, Take 1 tablet by mouth daily., Disp: 84 tablet, Rfl: 4      History   Smoking Status     Former Smoker     Packs/day: 0.25   Smokeless Tobacco     Never Used     Comment: quit 6 months ago       History   Alcohol Use No       Vitals:    07/07/17 0932   BP: 100/72   Pulse: 76   Resp: 16     Body mass index is 40.57 kg/(m^2).     EXAM:    General appearance - alert, well appearing, and in no  distress  Chest - clear to auscultation, no wheezes, rales or rhonchi, symmetric air entry  Skin - papular acne of face

## 2021-06-11 NOTE — TELEPHONE ENCOUNTER
Left message to call back for: Patient  Information to relay to patient:  Per Dr. Almonte: 1 refill given. Due for follow up visitbefore further refills - please call her to make an appt with Dr. Foster- may be virtual    CATHY/LAZARO

## 2021-06-12 NOTE — TELEPHONE ENCOUNTER
Called pharmacy who ran both generic and brand and all it is saying - med requires a JUAN JOSE Chapman.  Thanks.

## 2021-06-12 NOTE — TELEPHONE ENCOUNTER
Controlled Substance Refill Request  Medication Name: dextroamphetamine-amphetamine (ADDERALL XR) 30 MG 24 hr capsule  Requested Prescriptions      No prescriptions requested or ordered in this encounter     Date Last Fill: 12/10/2019  [facility]  Submit electronically to pharmacy  Controlled Substance Agreement on file:   Encounter-Level CSA Scan Date - 10/03/2017:    Scan on 10/6/2017 11:39 AM           Encounter-Level CSA Scan Date - 11/14/2016:    Scan on 11/14/2016        Last office visit:  3/12/2020  Patient is out of medication.

## 2021-06-12 NOTE — PROGRESS NOTES
Subjective:      Patient ID: Lucy Kaur is a 27 y.o. female.    Chief Complaint:    HPI  Lucy Kaur is a 27 y.o. female who presents today complaining of sore watery itchy eyes x 2 days. Patient has a significant history of environmental allergies. Patient has been using clear eyes drop. Her discharge is watery, with no thickness or color. She reports a burning sensation. She denies any visual disturbances.       Past Medical History:   Diagnosis Date     Asthma        Past Surgical History:   Procedure Laterality Date     FOOT SURGERY Bilateral      TX REMOVAL GALLBLADDER      Description: Cholecystectomy;  Recorded: 05/26/2011;       Family History   Problem Relation Age of Onset     Mental illness Mother      Asthma Father        Social History   Substance Use Topics     Smoking status: Former Smoker     Packs/day: 0.25     Smokeless tobacco: Never Used      Comment: quit 6 months ago     Alcohol use No       Review of Systems   Constitutional: Negative for fever.   HENT: Negative for congestion, rhinorrhea and sore throat.    Eyes: Positive for discharge, redness and itching. Negative for pain and visual disturbance.   Allergic/Immunologic: Positive for environmental allergies.       Objective:     /66  Pulse 73  Temp 98.1  F (36.7  C) (Oral)   Resp 20  Wt (!) 228 lb 4.8 oz (103.6 kg)  LMP 07/03/2017  SpO2 97%  BMI 40.44 kg/m2    Physical Exam   Constitutional: She appears well-developed and well-nourished. No distress.   HENT:   Head: Normocephalic and atraumatic.   Right Ear: External ear normal.   Left Ear: External ear normal.   Eyes: EOM are normal. Pupils are equal, round, and reactive to light. Right eye exhibits no discharge. Left eye exhibits no discharge. Right conjunctiva is injected. Right conjunctiva has no hemorrhage. Left conjunctiva is injected. Left conjunctiva has no hemorrhage. No scleral icterus.   Pulmonary/Chest: Effort normal and breath sounds normal. No  respiratory distress.   Skin: She is not diaphoretic.   Psychiatric: She has a normal mood and affect. Her behavior is normal. Judgment and thought content normal.   Nursing note and vitals reviewed.      Procedures    1. Allergic conjunctivitis  ketotifen (ALLERGY EYE, KETOTIFEN,) 0.025 % (0.035 %) ophthalmic solution         Patient Instructions   1.  Apply 1-2 drops in both eyes every 4-6 hours as needed for itching and burning.  2.  Follow-up if there is any change in your vision, change in discharge, or other sick symptoms such as fever, runny nose, or ear pain.

## 2021-06-12 NOTE — PATIENT INSTRUCTIONS - HE
Laboratory testing lab only in 6 to 8 weeks after she has been on [place] Thyroid  Referral to dermatology Accutane    Start Retin-A 0.05% daily

## 2021-06-12 NOTE — PROGRESS NOTES
"Lucy Kaur is a 30 y.o. female who is being evaluated via a billable telephone visit.      The patient has been notified of following:     \"This telephone visit will be conducted via a call between you and your physician/provider. We have found that certain health care needs can be provided without the need for a physical exam.  This service lets us provide the care you need with a short phone conversation.  If a prescription is necessary we can send it directly to your pharmacy.  If lab work is needed we can place an order for that and you can then stop by our lab to have the test done at a later time.    Telephone visits are billed at different rates depending on your insurance coverage. During this emergency period, for some insurers they may be billed the same as an in-person visit.  Please reach out to your insurance provider with any questions.    If during the course of the call the physician/provider feels a telephone visit is not appropriate, you will not be charged for this service.\"    Patient has given verbal consent to a Telephone visit? Yes    What phone number would you like to be contacted at? [phone]     Patient would like to receive their AVS by AVS Preference: Jean Claude.    Additional provider notes: see note      House 5 bedroom [place]       Assessment/Plan:  1. Attention deficit hyperactivity disorder (ADHD), predominantly inattentive type    - dextroamphetamine-amphetamine (ADDERALL XR) 30 MG 24 hr capsule; Take 1 capsule (30 mg total) by mouth every morning.  Dispense: 30 capsule; Refill: 0  - dextroamphetamine-amphetamine (ADDERALL XR) 30 MG 24 hr capsule; Take 1 capsule (30 mg total) by mouth every morning.  Dispense: 30 capsule; Refill: 0  - dextroamphetamine-amphetamine (ADDERALL XR) 30 MG 24 hr capsule; Take 1 capsule (30 mg total) by mouth every morning.  Dispense: 30 capsule; Refill: 0    2. Moderate asthma with exacerbation, unspecified whether persistent  Flovent 110 " daily   Montelukast   - albuterol (PROAIR HFA;PROVENTIL HFA;VENTOLIN HFA) 90 mcg/actuation inhaler; Inhale 2 puffs every 4 (four) hours as needed.  Dispense: 17 g; Refill: 12    3. Hypothyroidism, unspecified type    - thyroid, pork, 30 mg Tab; Take 1 tablet (30 mg total) by mouth daily.  Dispense: 90 tablet; Refill: 1    4. Thyroid nodule    - thyroid, pork, 30 mg Tab; Take 1 tablet (30 mg total) by mouth daily.  Dispense: 90 tablet; Refill: 1    5. Sore throat  Monitor     6. Asthma with acute exacerbation, unspecified asthma severity, unspecified whether persistent      7. Class 3 severe obesity with serious comorbidity and body mass index (BMI) of 40.0 to 44.9 in adult, unspecified obesity type (H)  Ketogenic     8. ACNE   Pt preference for Derm Accutan consult   Retin A 0.05 % apply thin film     Phone call duration:  21  Minutes  1:03 -- 1:24    Oseas Foster MD

## 2021-06-12 NOTE — TELEPHONE ENCOUNTER
[facility] pharmacy sent a fax stating the Trietinoin cream is not covered by pt insurance.  Requesting either a PA or another med.  How does PCP wish to proceed? Thanks.

## 2021-06-13 ENCOUNTER — AMBULATORY - HEALTHEAST (OUTPATIENT)
Dept: FAMILY MEDICINE | Facility: CLINIC | Age: 31
End: 2021-06-13

## 2021-06-13 DIAGNOSIS — E04.1 THYROID NODULE: ICD-10-CM

## 2021-06-13 DIAGNOSIS — E03.9 HYPOTHYROIDISM, UNSPECIFIED TYPE: ICD-10-CM

## 2021-06-13 NOTE — PROGRESS NOTES
CHIEF COMPLAINT: Lucy Kaur had concerns including ADHD; Medication Refill; and Cough.    [place]: 1.............. had concerns including ADHD; Medication Refill; and Cough.    1. PCOS (polycystic ovarian syndrome)    2. Moderate persistent asthma with exacerbation    3. Bronchospasm    4. Attention deficit hyperactivity disorder (ADHD), predominantly inattentive type    5. Obesity    6. Mild episode of recurrent major depressive disorder      No problem-specific Assessment & Plan notes found for this encounter.      CC:                Chief Complaint   Patient presents with     ADHD     follow up      Medication Refill     Cough     flaring up patient asthma mostly at night.        What's it like:                      Cough- starts dry and than mucus cough and will flare up her asthma   How long is it ongoing:      3-4 days   What makes it worse :       At night when laying down  What makes it better:         Using inhaler and neb  0/10-10/10:Pain/Intesity     None       Associated Sx:   Dry and mucus        SUBJECTIVE:  Lucy Kaur is a 27 y.o. female    Past Medical History:   Diagnosis Date     Asthma      Past Surgical History:   Procedure Laterality Date     FOOT SURGERY Bilateral      [place] REMOVAL GALLBLADDER      Description: Cholecystectomy;  Recorded: 05/26/2011;     Review of patient's allergies indicates no known allergies.  Current Outpatient Prescriptions   Medication Sig Dispense Refill     albuterol (PROAIR HFA) 90 mcg/actuation inhaler INHALE 2 PUFFS BY MOUTH EVERY 4 HOURS AS NEEDED 2 Inhaler 12     budesonide-formoterol (SYMBICORT) 160-4.5 mcg/actuation inhaler INHALE 2 PUFFS BY MOUTH TWICE DAILY 1 Inhaler 12     cetirizine (ZYRTEC) 10 MG tablet Take 10 mg by mouth daily.       clindamycin-benzoyl peroxide (BENZACLIN) gel APPLY BID TO FACE FOR ACNE 50 g 0     [START ON 11/1/2017] dextroamphetamine-amphetamine (ADDERALL XR) 30 MG 24 hr capsule Take 1 capsule (30 mg total) by mouth  every morning. 30 capsule 0     fluticasone (FLONASE) 50 mcg/actuation nasal spray 1-2 sprays in each nostril at bedtime. 16 g 0     ibuprofen (ADVIL,MOTRIN) 600 MG tablet Take 1 tablet (600 mg total) by mouth 3 (three) times a day. 270 tablet 0     ketotifen (ALLERGY EYE, KETOTIFEN,) 0.025 % (0.035 %) ophthalmic solution Apply 1-2 drops per eye every 4-6 hours as needed for itching or burning. 10 mL 0     lansoprazole (PREVACID) 30 MG capsule TAKE 1 CAPSULE BY MOUTH DAILY (Patient taking differently: TAKE 1 CAPSULE BY MOUTH DAILY PRN) 30 capsule 3     melatonin 5 mg Tab 2 at bedtime (Patient taking differently: at bedtime as needed. 2 at bedtime) 60 each 5     nebulizer accessories Misc Adult mask kit. Use as directed       nicotine polacrilex (NICORETTE) 4 MG gum USE 1 PIECE TO MOUTH AS NEEDED FOR SMOKING CESSATION. MAX 24 PIECES DAILY 170 each 2     norgestimate-ethinyl estradiol (ORTHO TRI-CYCLEN) 0.18/0.215/0.25 mg-35 mcg (28) Tab tablet Take 1 tablet by mouth daily. 84 tablet 4     traMADol (ULTRAM) 50 mg tablet Take 1 tablet (50 mg total) by mouth every 6 (six) hours as needed for pain. 12 tablet 0     azithromycin (ZITHROMAX) 500 MG tablet Take 1 tablet (500 mg total) by mouth daily for 3 days. Take 1 tablet daily for 3 days. 3 tablet 0     No current facility-administered medications for this visit.      Family History   Problem Relation Age of Onset     Mental illness Mother      Asthma Father      Social History     Social History     Marital status: Single     Spouse name: N/A     Number of children: N/A     Years of education: N/A     Social History Main Topics     Smoking status: Former Smoker     Packs/day: 0.25     Smokeless tobacco: Never Used      Comment: quit 6 months ago     Alcohol use No     Drug use: No     Sexual activity: Yes     Partners: Male     Birth control/ protection: None     Other Topics Concern     None     Social History Narrative     Patient Active Problem List   Diagnosis      Hypothyroidism     Anxiety Disorder NOS     Abnormal Pap smear of cervix     Nicotine Dependence     ADHD, Predominantly Inattentive Type     Asthma     Arthralgias In Multiple Sites     Difficulty Breathing (Dyspnea)     Allergies     Chronic Major Depression     Speech Phonation Hoarse     Obesity     Carpal tunnel syndrome on both sides                                              SOCIAL: She  reports that she has quit smoking. She smoked 0.25 packs per day. She has never used smokeless tobacco. She reports that she does not drink alcohol or use illicit drugs.    REVIEW OF SYSTEMS:     FAMILY HISTORY NOT PERTINENT TO CHIEF COMPLAINT OR PRESENTING PROBLEM  Review of systems otherwise negative as requested from patient, except   Positive ROS outlined and discussed in [place].    OBJECTIVE:  /78 (Patient Site: Left Arm, Patient Position: Sitting)  Pulse 86  Wt (!) 235 lb (106.6 kg)  LMP 09/27/2017 (Approximate)  SpO2 96%  Breastfeeding? No  BMI 42.98 kg/m2    GENERAL:     No acute distress.   Alert and oriented X 3         Physical:    Acneiform changes of the face  His mucosa is boggy  Conjunctivae chemotic  TMs are clear  Oropharynx is clear  Soft wheezing bilaterally  Heart regular rate and rhythm no appreciable murmur  Abdomen soft obese stretch marks  Calves are supple no edema  Medial calf right knee large contusion 3 cm from recent fall in the tub      ASSESSMENT & PLAN      Lucy was seen today for adhd, medication refill and cough.    Diagnoses and all orders for this visit:    PCOS (polycystic ovarian syndrome)    Moderate persistent asthma with exacerbation  -     budesonide-formoterol (SYMBICORT) 160-4.5 mcg/actuation inhaler; INHALE 2 PUFFS BY MOUTH TWICE DAILY    Bronchospasm  -     albuterol (PROAIR HFA) 90 mcg/actuation inhaler; INHALE 2 PUFFS BY MOUTH EVERY 4 HOURS AS NEEDED    Attention deficit hyperactivity disorder (ADHD), predominantly inattentive type  -     Discontinue:  dextroamphetamine-amphetamine (ADDERALL XR) 30 MG 24 hr capsule; Take 1 capsule (30 mg total) by mouth every morning.  -     dextroamphetamine-amphetamine (ADDERALL XR) 30 MG 24 hr capsule; Take 1 capsule (30 mg total) by mouth every morning.    Obesity    Mild episode of recurrent major depressive disorder    Other orders  -     azithromycin (ZITHROMAX) 500 MG tablet; Take 1 tablet (500 mg total) by mouth daily for 3 days. Take 1 tablet daily for 3 days.    Her labs I suspect she has PCO S as well as hyperinsulinemic state we talked about movement restorative sleep fasting between meals including liquids low carbohydrates if she has elevated insulin levels I had like to start her on metformin and doses stop and consider the addition of vICTOZA    Trolled substance agreement today discussed risk benefits of continuing Adderall she states this is effective and wishes to continue  No Follow-up on file.       Anticipatory Guidance and Symptomatic Cares Discussed   Advised to call back directly if there are further questions, or if these symptoms fail to improve as anticipated or worsen.  Return to clinic if patient has a clinical concern that warrants an exam.         25  Min Total Time, > 50% counseling and coordination of Care    Oseas Foster MD  Family Medicine   [facility] [address] [phone]

## 2021-06-14 NOTE — TELEPHONE ENCOUNTER
Prior Authorization Request  Who s requesting:  Pharmacy  Pharmacy Name and Location: [facility]  Medication Name: Adderall xr 30 mg  Insurance Plan:   Insurance Member ID Number:  [id number]  CoverMyMeds Key: N/A  Informed patient that prior authorizations can take up to 10 business days for response:   No  Okay to leave a detailed message: Yes

## 2021-06-14 NOTE — TELEPHONE ENCOUNTER
Central PA team  [phone]  Pool: HE PA MED ([id number])          PA has been initiated.       PA form completed and faxed insurance via Cover My Meds     Key:  [id number] - PA Case ID: [id number]     Medication:  Amphetamine-Dextroamphet ER 30MG er capsules    Insurance:  [employer]        Response will be received via fax and may take up to 5-10 business days depending on plan

## 2021-06-15 ENCOUNTER — COMMUNICATION - HEALTHEAST (OUTPATIENT)
Dept: FAMILY MEDICINE | Facility: CLINIC | Age: 31
End: 2021-06-15

## 2021-06-16 PROBLEM — Z78.9 HAS IMMUNITY TO COVID-19 VIRUS: Status: ACTIVE | Noted: 2021-06-13

## 2021-06-16 PROBLEM — L30.9 DERMATITIS: Status: ACTIVE | Noted: 2021-06-01

## 2021-06-16 PROBLEM — Z97.5 IUD CONTRACEPTION: Status: ACTIVE | Noted: 2019-03-18

## 2021-06-16 PROBLEM — E66.01 MORBID OBESITY (H): Status: ACTIVE | Noted: 2019-12-05

## 2021-06-16 PROBLEM — J02.9 SORE THROAT: Status: ACTIVE | Noted: 2020-10-30

## 2021-06-16 NOTE — TELEPHONE ENCOUNTER
RN cannot approve Refill Request    RN can NOT refill this medication Protocol failed and NO refill given. Last office visit: 3/12/2020 Oseas Foster MD Last Physical: 12/5/2019 Last MTM visit: Visit date not found Last visit same specialty: 3/12/2020 Oseas Foster MD.  Next visit within 3 mo: Visit date not found  Next physical within 3 mo: Visit date not found      Josephine Drummond, [facility] Triage/Med Refill 4/23/2021    Requested Prescriptions   Pending Prescriptions Disp Refills     montelukast (SINGULAIR) 10 mg tablet [Pharmacy Med Name: MONTELUKAST 10MG TABLETS] 90 tablet 3     Sig: TAKE 1 TABLET(10 MG) BY MOUTH DAILY       There is no refill protocol information for this order

## 2021-06-17 NOTE — PROGRESS NOTES
"ASSESSMENT & PLAN      Lucy was seen today for concerns.    Diagnoses and all orders for this visit:    Hypothyroid  -     Thyroid Stimulating Hormone (TSH)  -     T4, Free    Contraception  -     Ambulatory referral to Obstetrics / Gynecology    Obesity    Other orders  -     naltrexone (DEPADE) 50 mg tablet; Take 1 tablet (50 mg total) by mouth daily.        No Follow-up on file.           CHIEF COMPLAINT: Lucy Kaur had concerns including Concerns.    [place]: 1.............. had concerns including Concerns.    1. Hypothyroid    2. Contraception    3. Obesity      No problem-specific Assessment & Plan notes found for this encounter.      CC:             Patient's worried about weight gain she describes that she has tried everything she is currently involved in a new job she works in the evening  Goes to sleep from 8:30 AM to 3 PM  Works from 7 PM to 7 AM  She typically eats at 9 PM, 12 AM, 3 AM, 8 AM, and typically before she goes to work  She is currently on birth control  She has tried multiple \"diets  She has had a history of hypothyroidism most recent TSH was 33  She is stressed  Has 2 children at home  History of asthma  Insulin levels have been normal          SUBJECTIVE:  Lucy Kaur is a 28 y.o. female    Past Medical History:   Diagnosis Date     Asthma      Past Surgical History:   Procedure Laterality Date     FOOT SURGERY Bilateral      [place] G[place]      Description: Cholecystectomy;  Recorded: 05/26/2011;     Review of patient's allergies indicates no known allergies.  Current Outpatient Prescriptions   Medication Sig Dispense Refill     albuterol (PROAIR HFA) 90 mcg/actuation inhaler INHALE 2 PUFFS BY MOUTH EVERY 4 HOURS AS NEEDED 2 Inhaler 12     albuterol (PROVENTIL) 2.5 mg /3 mL (0.083 %) nebulizer solution Take 3 mL (2.5 mg total) by nebulization every 4 (four) hours as needed for wheezing. 25 vial 1     cetirizine (ZYRTEC) 10 MG tablet Take 10 mg by mouth " daily.       dextroamphetamine-amphetamine (ADDERALL XR) 30 MG 24 hr capsule Take 1 capsule (30 mg total) by mouth every morning. 30 capsule 0     ibuprofen (ADVIL,MOTRIN) 600 MG tablet Take 1 tablet (600 mg total) by mouth 3 (three) times a day. 270 tablet 0     lansoprazole (PREVACID) 30 MG capsule TAKE 1 CAPSULE BY MOUTH DAILY (Patient taking differently: TAKE 1 CAPSULE BY MOUTH DAILY PRN) 30 capsule 3     levothyroxine (SYNTHROID, LEVOTHROID) 100 MCG tablet Take 1 tablet (100 mcg total) by mouth daily. For hypothroidism 90 tablet 1     melatonin 5 mg Tab 2 at bedtime (Patient taking differently: at bedtime as needed. 2 at bedtime) 60 each 5     nebulizer accessories Misc Adult mask kit. Use as directed       norgestimate-ethinyl estradiol (ORTHO TRI-CYCLEN) 0.18/0.215/0.25 mg-35 mcg (28) Tab tablet Take 1 tablet by mouth daily. 84 tablet 4     budesonide-formoterol (SYMBICORT) 160-4.5 mcg/actuation inhaler INHALE 2 PUFFS BY MOUTH TWICE DAILY 1 Inhaler 12     clindamycin-benzoyl peroxide (BENZACLIN) gel APPLY BID TO FACE FOR ACNE 50 g 0     fluticasone (FLONASE) 50 mcg/actuation nasal spray 1-2 sprays in each nostril at bedtime. 16 g 0     fluticasone-salmeterol (ADVAIR) 500-50 mcg/dose DISKUS Inhale 1 puff 2 (two) times a day. 60 each 12     ketotifen (ZADITOR/ZYRTEC ITCHY EYES) 0.025 % (0.035 %) ophthalmic solution APPLY 1 TO 2 DROPS PER EYE EVERY 4 TO 6 HOURS AS NEEDED FOR ITCHING OR BURNING 30 mL 2     naltrexone (DEPADE) 50 mg tablet Take 1 tablet (50 mg total) by mouth daily. 30 tablet 5     nicotine polacrilex (NICORETTE) 4 MG gum USE 1 PIECE TO MOUTH AS NEEDED FOR SMOKING CESSATION. MAX 24 PIECES DAILY 170 each 2     predniSONE (DELTASONE) 10 mg tablet Take 3 tablets by mouth daily for 4 days, then take 2 tablets for 4 days, then 1 tablet for 4 days, then stop. 24 tablet 1     No current facility-administered medications for this visit.      Family History   Problem Relation Age of Onset     Mental  illness Mother      Asthma Father      Social History     Social History     Marital status: Single     Spouse name: N/A     Number of children: N/A     Years of education: N/A     Social History Main Topics     Smoking status: Former Smoker     Packs/day: 0.25     Smokeless tobacco: Never Used      Comment: quit 6 months ago     Alcohol use No     Drug use: No     Sexual activity: Yes     Partners: Male     Birth control/ protection: None     Other Topics Concern     None     Social History Narrative     Patient Active Problem List   Diagnosis     Hypothyroidism     Anxiety Disorder NOS     Abnormal Pap smear of cervix     Nicotine Dependence     ADHD, Predominantly Inattentive Type     Asthma     Arthralgias In Multiple Sites     Difficulty Breathing (Dyspnea)     Allergies     Chronic Major Depression     Speech Phonation Hoarse     Obesity     Carpal tunnel syndrome on both sides                                              SOCIAL: She  reports that she has quit smoking. She smoked 0.25 packs per day. She has never used smokeless tobacco. She reports that she does not drink alcohol or use illicit drugs.    REVIEW OF SYSTEMS:   Family history not pertinent to chief complaint or presenting problem    Review of systems otherwise negative as requested from patient, except   Those positive ROS outlined and discussed in [place].    OBJECTIVE:  /68 (Patient Site: Left Arm, Patient Position: Sitting, Cuff Size: Adult Large)  Pulse 86  Wt (!) 244 lb (110.7 kg)  LMP 03/29/2018 (Approximate)  SpO2 98%  BMI 44.63 kg/m2    GENERAL:     No acute distress.   Alert and oriented X 3         Physical:  BMI 44  Weight is up 25 pounds since last February  Truncal obesity  Full word strings  Tearful during the exam next today      Recent Results (from the past 240 hour(s))   Thyroid Stimulating Hormone (TSH)   Result Value Ref Range    TSH 32.70 (H) 0.30 - 5.00 uIU/mL   T4, Free   Result Value Ref Range    Free T4 0.6 (L)  0.7 - 1.8 ng/dL       ASSESSMENT & PLAN      Lucy was seen today for concerns.    Diagnoses and all orders for this visit:    Hypothyroid  -     Thyroid Stimulating Hormone (TSH)  -     T4, Free    Contraception  -     Ambulatory referral to Obstetrics / Gynecology    Obesity    Other orders  -     naltrexone (DEPADE) 50 mg tablet; Take 1 tablet (50 mg total) by mouth daily.        Supplements for Adrenal Support    Add Naltrexone 50 mg Daily take at  4PM  This means no opioids like tramadol.          Therapies to Support Sleep    Source Naturals Night Rest at 8:00 AM  V[employer] Energy Formula  V[employer]amin [employer] THyroid Support       Green tea or Extract 2cups daily         [employer] Brand  At [employer]:  25 $  2 months supply  Stress Support  Ashwaganda  Lemon Rosie  Bacopa  Passion Flower  RHodiola  Holy Basil      No Follow-up on file.       Anticipatory Guidance and Symptomatic Cares Discussed   Advised to call back directly if there are further questions, or if these symptoms fail to improve as anticipated or worsen.  Return to clinic if patient has a clinical concern that warrants an exam.        25  Min Total Time, > 50% counseling and coordination of Care    Oseas Foster MD  Family Medicine   [facility] [address] [phone]

## 2021-06-17 NOTE — TELEPHONE ENCOUNTER
Telephone Encounter by Mei Soto at 1/13/2021  8:08 AM     Author: Mei Soto Service: -- Author Type: --    Filed: 1/13/2021  8:08 AM Encounter Date: 1/11/2021 Status: Signed    : Mei Soto APPROVED:    Approval start date: 01/12/2021  Approval end date:  01/12/2024    Pharmacy has been notified of approval and will contact patient when medication is ready for pickup.

## 2021-06-17 NOTE — PROGRESS NOTES
Chief Complaint   Patient presents with     Shortness of Breath     Phlem, 1x week.  chest congested, cough.          HPI      Patient is here for a week of productive cough, associated with wheezing and shortness of breath, partially improved with Albuterol. No fever, chest pain. She has asthma.    ROS: Pertinent ROS noted in HPI.     No Known Allergies    Patient Active Problem List   Diagnosis     Hypothyroidism     Anxiety Disorder NOS     Abnormal Pap smear of cervix     Nicotine Dependence     ADHD, Predominantly Inattentive Type     Asthma     Arthralgias In Multiple Sites     Difficulty Breathing (Dyspnea)     Allergies     Chronic Major Depression     Speech Phonation Hoarse     Obesity     Carpal tunnel syndrome on both sides       Family History   Problem Relation Age of Onset     Mental illness Mother      Asthma Father        Social History     Social History     Marital status: Single     Spouse name: N/A     Number of children: N/A     Years of education: N/A     Occupational History     Not on file.     Social History Main Topics     Smoking status: Former Smoker     Packs/day: 0.25     Smokeless tobacco: Never Used      Comment: quit 6 months ago     Alcohol use No     Drug use: No     Sexual activity: Yes     Partners: Male     Birth control/ protection: None     Other Topics Concern     Not on file     Social History Narrative         Objective:    Vitals:    04/11/18 0908   BP: 128/74   Pulse: 94   Resp: 14   Temp: 98.4  F (36.9  C)   SpO2: 97%       Gen:NAD  CV: RRR, normal S1S2, no M, R, G  Pulm: scattered wheezes bilaterally, no crackles/ronhchi, normal effort    CXR - no evidence of pneumonia per my interpretation, discussed during visit.          Acute bronchitis with bronchospasm - she felt better after albuterol neb tx, she has prednisone, advised her to start her regimen  -     albuterol (PROVENTIL) 2.5 mg /3 mL (0.083 %) nebulizer solution; Take 3 mL (2.5 mg total) by nebulization every  4 (four) hours as needed for wheezing.    Cough  -     XR Chest 2 Views  -     albuterol nebulizer solution 3 mL (PROVENTIL); Take 3 mL by nebulization once.  -     albuterol (PROVENTIL) 2.5 mg /3 mL (0.083 %) nebulizer solution; Take 3 mL (2.5 mg total) by nebulization every 4 (four) hours as needed for wheezing.

## 2021-06-19 NOTE — LETTER
Letter by Oseas Foster MD at      Author: Oseas Foster MD Service: -- Author Type: --    Filed:  Encounter Date: 3/18/2019 Status: (Other)         [facility] FAMILY MEDICINE/OB  03/18/19    Patient: Lucy Kaur  YOB: 1990  Medical Record Number: [id number]  CSN: [id number]                                                                              Non-opioid Controlled Substance Agreement    I understand that my care provider has prescribed a controlled substance to help manage my condition(s). I am taking this medicine to help me function or work. I know this is strong medicine, and that it can cause serious side effects. Controlled substances can be sedating, addicting and may cause a dependency on the drug. They can affect my ability to drive or think, and cause depression. They need to be taken exactly as prescribed. Combining controlled substances with certain medicines or chemicals (such as cocaine, sedatives and tranquilizers, sleeping pills, meth) can be dangerous or even fatal. Also, if I stop controlled substances suddenly, I may have severe withdrawal symptoms.  If not helpful, I may be asked to stop them.    The risks, benefits, and side effects of these medicine(s) were explained to me. I agree that:    1. I will take part in other treatments as advised by my care team. This may be psychiatry or counseling, physical therapy, behavioral therapy, group treatment or a referral to a pain clinic. I will reduce or stop my medicine when my care team tells me to do so.  2. I will take my medicines as prescribed. I will not change the dose or schedule unless my care team tells me to. There will be no refills if I run out early.  I may be contactedwithout warning and asked to complete a urine drug test or pill count at any time.   3. I will keep all my appointments, and understand this is part of the monitoring of controlled substances. My care team may require an office  visit for EVERY controlled substance refill. If I miss appointments or dont follow instructions, my care team may stop my medicine.  4. I will not ask other providers to prescribe controlled substances, and I will not accept controlled substances from other people. If I need another prescribed controlled substance for a new reason, I will tell my care team within 1 business day.  5. I will use one pharmacy to fill all of my controlled substance prescriptions, and it is up to me to make sure that I do not run out of my medicines on weekends or holidays. If my care team is willing to refill my controlled substance prescription without a visit, I must request refills only during office hours, refills may take up to 3 days to process, and it may take up to 5 to 7 days for my medicine to be mailed and ready at my pharmacy. Prescriptions will not be mailed anywhere except my pharmacy.    6. I am responsible for my prescriptions. If the medicine/prescription is lost or stolen, it will not be replaced. I also agree not to share controlled substance medicines with anyone.          [facility] FAMILY MEDICINE/OB  03/18/19  Patient:  Lucy Kaur  YOB: 1990  Medical Record Number: [id number]  CSN: [id number]    7. I agree to not use ANY illegal or recreational drugs. This includes marijuana, cocaine, bath salts or other drugs. I agree not to use alcohol unless my care team says I may. I agree to give urine samples whenever asked. If I dont give a urine sample, the care team may stop my medicine.    8. If I enroll in the [place] Medical Marijuana program, I will tell my care team. I will also sign an agreement to share my medical records with my care team.    9. I will bring in my list of medicines (or my medicine bottles) each time I come to the clinic.   10. I will tell my care team right away if I become pregnant or have a new medical problem treated outside of my regular clinic.  11. I  understand that this medicine can affect my thinking and judgment. It may be unsafe for me to drive, use machinery and do dangerous tasks. I will not do any of these things until I know how the medicine affects me. If my dose changes, I will wait to see how it affects me. I will contact my care team if I have concerns about medicine side effects.    I understand that if I do not follow any of the conditions above, my prescriptions or treatment may be stopped.      I agree that my provider, clinic care team, and pharmacy may work with any city, state or federal law enforcement agency that investigates the misuse, sale, or other diversion of my controlled medicine. I will allow my provider to discuss my care with or share a copy of this agreement with any other treating provider, pharmacy or emergency room where I receive care. I agree to give up (waive) any right of privacy or confidentiality with respect to these consents.   I have read this agreement and have asked questions about anything I did not understand.    ___________________________________________________________________________  Patient signature - Date/Time  -Lucy Kaur                                      ___________________________________________________________________________  Witness signature                                                                    ___________________________________________________________________________  Provider signature- Oseas Foster MD

## 2021-06-19 NOTE — LETTER
Letter by Oseas Foster MD at      Author: Oseas Foster MD Service: -- Author Type: --    Filed:  Encounter Date: 12/5/2019 Status: Signed         [facility] FAMILY MEDICINE/OB  12/05/19    Patient: Lucy Kaur  YOB: 1990  Medical Record Number: [id number]  CSN: [id number]                                                                              Non-opioid Controlled Substance Agreement  ADDERALL    I understand that my care provider has prescribed a controlled substance to help manage my condition(s). I am taking this medicine to help me function or work. I know this is strong medicine, and that it can cause serious side effects. Controlled substances can be sedating, addicting and may cause a dependency on the drug. They can affect my ability to drive or think, and cause depression. They need to be taken exactly as prescribed. Combining controlled substances with certain medicines or chemicals (such as cocaine, sedatives and tranquilizers, sleeping pills, meth) can be dangerous or even fatal. Also, if I stop controlled substances suddenly, I may have severe withdrawal symptoms.  If not helpful, I may be asked to stop them.    The risks, benefits, and side effects of these medicine(s) were explained to me. I agree that:    1. I will take part in other treatments as advised by my care team. This may be psychiatry or counseling, physical therapy, behavioral therapy, group treatment or a referral to a pain clinic. I will reduce or stop my medicine when my care team tells me to do so.  2. I will take my medicines as prescribed. I will not change the dose or schedule unless my care team tells me to. There will be no refills if I run out early.  I may be contactedwithout warning and asked to complete a urine drug test or pill count at any time.   3. I will keep all my appointments, and understand this is part of the monitoring of controlled substances. My care team may require an  office visit for EVERY controlled substance refill. If I miss appointments or dont follow instructions, my care team may stop my medicine.  4. I will not ask other providers to prescribe controlled substances, and I will not accept controlled substances from other people. If I need another prescribed controlled substance for a new reason, I will tell my care team within 1 business day.  5. I will use one pharmacy to fill all of my controlled substance prescriptions, and it is up to me to make sure that I do not run out of my medicines on weekends or holidays. If my care team is willing to refill my controlled substance prescription without a visit, I must request refills only during office hours, refills may take up to 3 days to process, and it may take up to 5 to 7 days for my medicine to be mailed and ready at my pharmacy. Prescriptions will not be mailed anywhere except my pharmacy.    6. I am responsible for my prescriptions. If the medicine/prescription is lost or stolen, it will not be replaced. I also agree not to share controlled substance medicines with anyone.          [facility] FAMILY MEDICINE/OB  12/05/19  Patient:  Lucy Kaur  YOB: 1990  Medical Record Number: [id number]  CSN: [id number]    7. I agree to not use ANY illegal or recreational drugs. This includes marijuana, cocaine, bath salts or other drugs. I agree not to use alcohol unless my care team says I may. I agree to give urine samples whenever asked. If I dont give a urine sample, the care team may stop my medicine.    8. If I enroll in the [place] Medical Marijuana program, I will tell my care team. I will also sign an agreement to share my medical records with my care team.    9. I will bring in my list of medicines (or my medicine bottles) each time I come to the clinic.   10. I will tell my care team right away if I become pregnant or have a new medical problem treated outside of my regular clinic.  11. I  understand that this medicine can affect my thinking and judgment. It may be unsafe for me to drive, use machinery and do dangerous tasks. I will not do any of these things until I know how the medicine affects me. If my dose changes, I will wait to see how it affects me. I will contact my care team if I have concerns about medicine side effects.    I understand that if I do not follow any of the conditions above, my prescriptions or treatment may be stopped.      I agree that my provider, clinic care team, and pharmacy may work with any city, state or federal law enforcement agency that investigates the misuse, sale, or other diversion of my controlled medicine. I will allow my provider to discuss my care with or share a copy of this agreement with any other treating provider, pharmacy or emergency room where I receive care. I agree to give up (waive) any right of privacy or confidentiality with respect to these consents.   I have read this agreement and have asked questions about anything I did not understand.    ___________________________________________________________________________  Patient signature - Date/Time  -Lucy Kaur                                      ___________________________________________________________________________  Witness signature                                                                    ___________________________________________________________________________  Provider signature- Oseas Foster MD

## 2021-06-20 NOTE — LETTER
Letter by Oseas Foster MD at      Author: Oseas Foster MD Service: -- Author Type: --    Filed:  Encounter Date: 5/21/2020 Status: (Other)         May 21, 2020     Patient: Lucy Kaur   YOB: 1990   Date of Visit: 5/21/2020       To Whom It May Concern:    It is my medical opinion that Lucy Kaur (Toni) is at increased risk for complications due to a Covid-19 SARS-CoV-2 infection.      I have had a malika discussion with Rodney about her  work environment.  I do believe it poses increased risk of exposure, due to movement in and about her work space and exposure to her coworkers/colleagues in this physical work space.      It is my recommendation that she should work from home at the present time, if at all possible, to mitigate her health risk. Because of her underlying her conditions, an infection poses a higher likelihood of a life threatening complication.  This recommendation should be re-evaluated in 6 months or if there is another relevant change in her work setting.     The goal of this recommendation is to promote safety and continued wellness.          If you have any questions or concerns, please don't hesitate to call.    Sincerely,      Oseas Foster MD     Electronically signed by Oseas Foster MD

## 2021-06-20 NOTE — LETTER
Letter by Oseas Foster MD at      Author: Oseas Foster MD Service: -- Author Type: --    Filed:  Encounter Date: 5/21/2020 Status: (Other)         May 21, 2020     Patient: Lucy Kaur   YOB: 1990   Date of Visit: 5/21/2020       To Whom It May Concern:    It is my medical opinion that Lucy Kaur (Toni) is at increased risk for complications due to a Covid-19 SARS-CoV-2 infection.      I have had a malika discussion with Rodney about her  work environment.  I do believe it poses increased risk of exposure, due to movement in and about her work space and exposure to her coworkers/colleagues in this physical work space.      It is my recommendation that she should work from home at the present time, if at all possible, to mitigate her health risk. Because of her underlying her conditions, an infection poses a higher likelihood of a life threatening complication.  This recommendation should be re-evaluated in 6 months or if there is another relevant change in his work setting.     The goal of this recommendation is to promote safety and continued wellness.          If you have any questions or concerns, please don't hesitate to call.    Sincerely,      Oseas Foster MD     Electronically signed by Oseas Foster MD

## 2021-06-20 NOTE — LETTER
Letter by Oseas Foster MD at      Author: Oseas Foster MD Service: -- Author Type: --    Filed:  Encounter Date: 5/21/2020 Status: (Other)         May 21, 2020     No Recipients    Patient: Lucy Kaur   MR Number: [id number]   YOB: 1990   Date of Visit: 5/21/2020       Dear Dr. Wyatt Recipients:    Thank you for referring Lucy Kaur to me for evaluation. Below are the relevant portions of my assessment and plan of care.         If you have questions, please do not hesitate to call me. I look forward to following Lucy along with you.    Sincerely,        Oseas Foster MD        CC  No Recipients

## 2021-06-20 NOTE — LETTER
Letter by Oseas Foster MD at      Author: Oseas Foster MD Service: -- Author Type: --    Filed:  Encounter Date: 3/12/2020 Status: (Other)       My Asthma Action Plan    Name: Lucy Kaur   YOB: 1990  Date: 3/12/2020   My doctor: Oseas Foster MD   My clinic: [facility] FAMILY MEDICINE/OB        My Control Medicine: Fluticasone propionate Flovent  -  112mcg 1 puff bid     Allergy   Cetirizine Flonase   My Rescue Medicine: Albuterol (Proair/Ventolin/Proventil HFA) 2-4 puffs EVERY 4 HOURS as needed. Use a spacer if recommended by your provider.   My Oral Steroid Medicine: call for steroids  My Asthma Severity:   Moderate Persistent  Know your asthma triggers: upper respiratory infections and pollens               GREEN ZONE   Good Control    I feel good    No cough or wheeze    Can work, sleep and play without asthma symptoms     Take your asthma control medicine every day.     1. If exercise triggers your asthma, take your rescue medication    15 minutes before exercise or sports, and    During exercise if you have asthma symptoms  2. Spacer to use with inhaler: If you have a spacer, make sure to use it with your inhaler             YELLOW ZONE Getting Worse  I have ANY of these:    I do not feel good    Cough or wheeze    Chest feels tight    Wake up at night 1. Keep taking your Green Zone medications  2. Start taking your rescue medicine:    every 20 minutes for up to 1 hour. Then every 4 hours for 24-48 hours.  3. If you stay in the Yellow Zone for more than 12-24 hours, contact your doctor.  4. If you do not return to the Green Zone in 12-24 hours or you get worse, start taking your oral steroid medicine if prescribed by your provider.           RED ZONE Medical Alert - Get Help  I have ANY of these:    I feel awful    Medicine is not helping    Breathing getting harder    Trouble walking or talking    Nose opens wide to breathe     1. Take your rescue medicine  NOW  2. If your provider has prescribed an oral steroid medicine, start taking it NOW  3. Call your doctor NOW  4. If you are still in the Red Zone after 20 minutes and you have not reached your doctor:    Take your rescue medicine again and    Call 911 or go to the emergency room right away    See your regular doctor within 2 weeks of an Emergency Room or Urgent Care visit for follow-up treatment.          Annual Reminders:  Meet with Asthma Educator,  Flu Shot in the Fall, consider Pneumonia Vaccination for patients with asthma (aged 19 and older).    Pharmacy:   [id number] - [place], [place] - [address] [place] [address] AT [address] [place] [address]  Phone: [phone] Fax: [phone]      Electronically signed by Oseas Foster MD   Date: 03/12/20                    Asthma Triggers  How To Control Things That Make Your Asthma Worse    Triggers are things that make your asthma worse.  Look at the list below to help you find your triggers and what you can do about them.  You can help prevent asthma flare-ups by staying away from your triggers.      Trigger                                                          What you can do   Cigarette Smoke  Tobacco smoke can make asthma worse. Do not allow smoking in your home, car or around you.  Be sure no one smokes at a katie day care or school.  If you smoke, ask your health care provider for ways to help you quit.  Ask family members to quit too.  Ask your health care provider for a referral to Quit Plan to help you quit smoking, or call 0-510-612-PLAN.     Colds, Flu, Bronchitis  These are common triggers of asthma. Wash your hands often.  Dont touch your eyes, nose or mouth.  Get a flu shot every year.     Dust Mites  These are tiny bugs that live in cloth or carpet. They are too small to see. Wash sheets and blankets in hot water every week.   Encase pillows and mattress in dust mite proof covers.  Avoid having carpet  if you can. If you have carpet, vacuum weekly.   Use a dust mask and HEPA vacuum.   Pollen and Outdoor Mold  Some people are allergic to trees, grass, or weed pollen, or molds. Try to keep your windows closed.  Limit time out doors when pollen count is high.   Ask you health care provider about taking medicine during allergy season.     Animal Dander  Some people are allergic to skin flakes, urine or saliva from pets with fur or feathers. Keep pets with fur or feathers out of your home.    If you cant keep the pet outdoors, then keep the pet out of your bedroom.  Keep the bedroom door closed.  Keep pets off cloth furniture and away from stuffed toys.     Mice, Rats, and Cockroaches   Some people are allergic to the waste from these pests.   Cover food and garbage.  Clean up spills and food crumbs.  Store grease in the refrigerator.   Keep food out of the bedroom.   Indoor Mold  This can be a trigger if your home has high moisture. Fix leaking faucets, pipes, or other sources of water.   Clean moldy surfaces.  Dehumidify basement if it is damp and smelly.   Smoke, Strong Odors, and Sprays  These can reduce air quality. Stay away from strong odors and sprays, such as perfume, powder, hair spray, paints, smoke incense, paint, cleaning products, candles and new carpet.   Exercise or Sports  Some people with asthma have this trigger. Be active!  Ask your doctor about taking medicine before sports or exercise to prevent symptoms.    Warm up for 5-10 minutes before and after sports or exercise.     Other Triggers of Asthma  Cold air:  Cover your nose and mouth with a scarf.  Sometimes laughing or crying can be a trigger.  Some medicines and food can trigger asthma.

## 2021-06-23 NOTE — TELEPHONE ENCOUNTER
Controlled Substance Refill Request  Medication:   Requested Prescriptions     Pending Prescriptions Disp Refills     dextroamphetamine-amphetamine (ADDERALL XR) 30 MG 24 hr capsule 30 capsule 0     Sig: Take 1 capsule (30 mg total) by mouth every morning.     Date Last Fill: 12/6/18  Pharmacy:  [id number] - [place], [place] - [address]  [phone]     Submit electronically to pharmacy  Controlled Substance Agreement on File:   Encounter-Level CSA Scan Date - 10/03/2017:    Scan on 10/6/2017 11:39 AM (below)             Encounter-Level CSA Scan Date - 11/14/2016:    Scan on 11/14/2016 (below)         Last office visit: 4/16/2018 Oseas Foster MD

## 2021-06-24 NOTE — TELEPHONE ENCOUNTER
Left message to call back for: Pt  Information to relay to patient:  Rx was sent.  Pt is due for an office visit and lab/CSA.  When pt calls back, pleas assist pt in making an appt.  Pt would need to be seen prior for further refills.

## 2021-06-24 NOTE — TELEPHONE ENCOUNTER
RN cannot approve Refill Request    RN can NOT refill this medication med is not covered by policy/route to provider     . Last office visit: 4/16/2018 Oseas Foster MD Last Physical: 11/14/2016 Last MTM visit: Visit date not found Last visit same specialty: 4/16/2018 Oseas Foster MD.  Next visit within 3 mo: Visit date not found  Next physical within 3 mo: Visit date not found      Daphne Pickett, [facility] Triage/Med Refill 2/13/2019    Requested Prescriptions   Pending Prescriptions Disp Refills     budesonide-formoterol (SYMBICORT) 160-4.5 mcg/actuation inhaler [Pharmacy Med Name: SYMBICORT 160/4.5MCG (120 ORAL INH)]  0     Sig: INHALE 2 PUFFS BY MOUTH TWICE DAILY    There is no refill protocol information for this order

## 2021-06-24 NOTE — TELEPHONE ENCOUNTER
Controlled Substance Refill Request  Medication: dextroamphetamine-amphetamine (ADDERALL XR) 30 MG 24 hr capsule  Requested Prescriptions      No prescriptions requested or ordered in this encounter     Date Last Fill:  1/18/19   Pharmacy: [facility]- [address]   Submit electronically to pharmacy  Controlled Substance Agreement on File:   Encounter-Level CSA Scan Date - 10/03/2017:    Scan on 10/6/2017 11:39 AM (below)             Encounter-Level CSA Scan Date - 11/14/2016:    Scan on 11/14/2016 (below)         Last office visit: 4/16/2018 Oseas Foster MD

## 2021-06-24 NOTE — TELEPHONE ENCOUNTER
Please contact this patient.  This person is due for an office visit and possibly labs work and/or paperwork.      If not seen, I will by protocol no refill medications    Thanks     Jing

## 2021-06-25 NOTE — TELEPHONE ENCOUNTER
Central PA team  [phone]  Pool: HE PA MED ([id number])          PA has been initiated.       PA form completed and faxed insurance via Cover My Meds     Key:  [id number]     Medication:  SYMBICORT 160-4.5    Insurance:  [facility]        Response will be received via fax and may take up to 5-10 business days depending on plan

## 2021-06-25 NOTE — PATIENT INSTRUCTIONS - HE
"Please accomplish your thyroid ultrasound to exclude any thyroid nodules  We like to have a visit every 6 months  For ADD  Continue use your inhalers  Please stay active and know that we have nutritionist to work with a very nutritions for weight reduction weight rebalancing programs    Thyroid we should for a TSH of 1  Focused Nutrition:  Eat Clean and Whole Foods  These are single ingredient foods that possess vital nutrients which can touch and affect our health in a positive manner.            Lean Meats Protein and Fat---- Nuts, Eggs,Veggies, Fish and Lean Meats especially fish and poultry. Meat and Eggs in their natural form have No Sugar.  Try to choose good fat from Fish, Nuts, Avocados, Extra Virgin Olive Oil (not cooked) other vegetables for example. Opt for Plant Proteins and Fats over Animal Fats      For High Protein Eat---- Meats, Fish, Lean Meats, Beans, Nuts, and Quinoa/ other Whole grains    Focus on \"Whole Foods\":  You know what the food is by looking at it and comes from a plant, tree, animal or the sea.  Single source organically grown if possible.    Look for Low Glycemic foods:  Try to Avoid foods with any added sugars and absolutely  no High Fructose Corn Syrup    Lean Meats Protein and Fat: These in their natural form have No Sugar.  Go for the good fat from Fish, Nuts, Avocados, Extra Virgin Olive Oil (not cooked) other vegetables for example. Opt for Plant Proteins and Fats over Animal Fats    Beans are excellent complex carbohydrates with fiber- black, garbanzo, lentil, kidney, lima, Ovi, Mccloud    Simple flours and breads, sigh,  in general are simple sugars, when processed and should be avoided.  However there are beneficial fibers in wholes grains.  So if choosing, go for the 100% whole-grain Grains --Bran, brown rice, Flax----Fiber offsets glycemic affect    Vegetables: most vegetables are low glycemic with the exception of starchy ones:  potatoes, carrots, corn, and beats    Whole " Fruits mostly are low glycemic:  try to eat his snacks: Try not to pair with fat.  Insulin burns sugar and stores fat as a job     High glycemic fruits: Dates, Watermelon, Figgs, Apricots, Raisins but in moderation OK    For Snacks go for nuts and seeds unless you are instructed not to do so due to another health condition; please although tempting avoid corn chips, jellybeans, pretzels other processed snacks that usually pair sugar and fat.      Probiotics and Prebiotics support digestion and our immune system!    Foods that support this are, among others:    Pros: Kefir, Kombucha, Miso, Pickled Vegetables, Latasha Kraut, Yogurt, Tempeh,     Pre: Asparagus, Bananas, Eggplant, Garlic, Honey, Kefir, Leeks, Legumes, Onions, Peas, Whole Grains, and Yogurt    Nutrients support our cellular machinery:  High nutrient food support this.    Rest helps digest.  We need restorative sleep  8- 10 hours.  We should also try to have 10 to 12 hours at some point between meals, e.g. 7 PM to 7 AM     Fiber: Luis F Seed or Flax Seed or Hemp Seeds:  2-3 tablespoons daily for fiber and Omega 3s      Move everyday your body and sweat!    Get good 8 to 10 hours of Sleep and Hydrate with Water      If your Triglycerides are High:    Look into a Ketogenic Diet    Always choose --No added Sugar..    Fish Oil 2000 mg Daily and/or Flax or Luis F seeds  Milled 2 tablespoons                            Daily    Luis F seed in Wilder Milk over night to make pudding    Nuts Especially walnuts.    Fish especially  [place], Mackerel, Anchovy,Sardine and Herring    Vegetables opt for multiple colors daily  New Goal 3- 5 cups of fruits and                         veggies Daily!!      Fermented Foods Rock!  You can do your own preserving and culturing of good bacteria!      Drink fermented Kombuchaa  Eat Cultured vegetable like Kimchi or Sauerkraut  Apple Cider Vinegar Unfiltered can be added 8 oz fizzy water  Foods:  Beets, Celery, Lemon, Lime, Grapefruit,  "Cucumber and Carrots    Use Bitter Herbs:  Nia, Arugala, Cilantro, Turmeric, Dandelion, CUmin, Fennel, Mint, Leeks, Parsley, and Milk THistle    Practice Fastin hours from last meal in evening to first meal in morming    Chlorophyll Rich Foods may help, add to water CHorella or Spirulina    Eat Kale and Broccoli Sprouts --- Sulfurophane rich      Eat Cruciferous Vegetables Daily:    Broccoli, Cauliflower, Kale, Collards, Brussel Sprouts    Eat Lots of Fiber:      Soluble:   Luis F, Flax Hemp, Pumpkin Seeds  Insoluble:  Fruits and Veggies  Best sources of  Chicory Root Ground, Dandelion Root, Asparagus, Leeks and Onion, Bananas ( more green), Sprouted Wheat eg John Bread , Garlic, [place] Artichokes,)       Lastly we have to think of things that are toxic to our health, which may contribute to poor health and disease.  An apple covered in pesticides has both healthy and toxic components for our system.  The very \"healthy choices\" may be laced with toxins.  So choose foods wisely and discriminatingly.      Here are some recommendations about when and when not to choose organic foods.  When in doubt wash!      The group identified the following items on its  Dirty Dozen  list of produce with the most pesticide residue:   1. Strawberries  2. Spinach  3. Nectarines  4. Apples  5. Grapes  6. Peaches  7. Cherries  8. Pears  9. Tomatoes  10. Celery  11. Potatoes  12. Sweet Bell Peppers  Here are the items the EWG identified for its  Clean 15,  which report the least likelihood to contain pesticide residue.  1. Avocados  2. Sweet Corn  3. Pineapples  4. Cabbages  5. Onions  6. Sweet Peas  7. Papayas  8. Asparagus  9. Mangoes  10. Eggplants  11. Honeydews  12. Kiwis  13. Cantaloupes  14. Cauliflower  15. Broccoli          Eat well, Get Good Sleep and Stay Active!    DanL    "

## 2021-06-25 NOTE — PROGRESS NOTES
"ASSESSMENT & PLAN    ADHD, Predominantly Inattentive Type  Psychological condition is unchanged.  Continue current treatment regimen.  Psychological condition  will be reassessed at the next regular appointment     Paying attention difficult   don't finish things\"   Skipped and \"did not get anything done\".  Don' cathc first time around      [facility] was seen today for follow-up.    Diagnoses and all orders for this visit:    Moderate persistent asthma without complication    Bronchospasm  -     albuterol (PROAIR HFA) 90 mcg/actuation inhaler; INHALE 2 PUFFS BY MOUTH EVERY 4 HOURS AS NEEDED    Cough  -     albuterol (PROVENTIL) 2.5 mg /3 mL (0.083 %) nebulizer solution; Take 3 mL (2.5 mg total) by nebulization every 4 (four) hours as needed for wheezing.    Acute bronchitis with bronchospasm  -     albuterol (PROVENTIL) 2.5 mg /3 mL (0.083 %) nebulizer solution; Take 3 mL (2.5 mg total) by nebulization every 4 (four) hours as needed for wheezing.    Attention deficit disorder (ADD) without hyperactivity  -     Drug Abuse 1+, Urine    Attention deficit hyperactivity disorder (ADHD), predominantly inattentive type  -     Discontinue: dextroamphetamine-amphetamine (ADDERALL XR) 30 MG 24 hr capsule; Take 1 capsule (30 mg total) by mouth every morning.  -     dextroamphetamine-amphetamine (ADDERALL XR) 30 MG 24 hr capsule; Take 1 capsule (30 mg total) by mouth every morning.    Hypothyroidism, unspecified type  -     T3, Total  -     T4, Free  -     T4, Total  -     Thyroid Peroxidase Antibody  -     Thyroid Stimulating Hormone (TSH)  -     US Thyroid; Future    IUD contraception    Other orders  -     levothyroxine (SYNTHROID, LEVOTHROID) 100 MCG tablet; Take 1 tablet (100 mcg total) by mouth daily. For hypothroidism        Patient Instructions   Please accomplish your thyroid ultrasound to exclude any thyroid nodules  We like to have a visit every 6 months  For ADD  Continue use your inhalers  Please stay active and " "know that we have nutritionist to work with a very nutritions for weight reduction weight rebalancing programs    Thyroid we should for a TSH of 1  Focused Nutrition:  Eat Clean and Whole Foods  These are single ingredient foods that possess vital nutrients which can touch and affect our health in a positive manner.            Lean Meats Protein and Fat---- Nuts, Eggs,Veggies, Fish and Lean Meats especially fish and poultry. Meat and Eggs in their natural form have No Sugar.  Try to choose good fat from Fish, Nuts, Avocados, Extra Virgin Olive Oil (not cooked) other vegetables for example. Opt for Plant Proteins and Fats over Animal Fats      For High Protein Eat---- Meats, Fish, Lean Meats, Beans, Nuts, and Quinoa/ other Whole grains    Focus on \"Whole Foods\":  You know what the food is by looking at it and comes from a plant, tree, animal or the sea.  Single source organically grown if possible.    Look for Low Glycemic foods:  Try to Avoid foods with any added sugars and absolutely  no High Fructose Corn Syrup    Lean Meats Protein and Fat: These in their natural form have No Sugar.  Go for the good fat from Fish, Nuts, Avocados, Extra Virgin Olive Oil (not cooked) other vegetables for example. Opt for Plant Proteins and Fats over Animal Fats    Beans are excellent complex carbohydrates with fiber- black, garbanzo, lentil, kidney, lima, Ovi, Mccloud    Simple flours and breads, sigh,  in general are simple sugars, when processed and should be avoided.  However there are beneficial fibers in wholes grains.  So if choosing, go for the 100% whole-grain Grains --Bran, brown rice, Flax----Fiber offsets glycemic affect    Vegetables: most vegetables are low glycemic with the exception of starchy ones:  potatoes, carrots, corn, and beats    Whole Fruits mostly are low glycemic:  try to eat his snacks: Try not to pair with fat.  Insulin burns sugar and stores fat as a job     High glycemic fruits: Dates, Watermelon, " Figgs, Apricots, Raisins but in moderation OK    For Snacks go for nuts and seeds unless you are instructed not to do so due to another health condition; please although tempting avoid corn chips, jellybeans, pretzels other processed snacks that usually pair sugar and fat.      Probiotics and Prebiotics support digestion and our immune system!    Foods that support this are, among others:    Pros: Kefir, Kombucha, Miso, Pickled Vegetables, Latasha Kraut, Yogurt, Tempeh,     Pre: Asparagus, Bananas, Eggplant, Garlic, Honey, Kefir, Leeks, Legumes, Onions, Peas, Whole Grains, and Yogurt    Nutrients support our cellular machinery:  High nutrient food support this.    Rest helps digest.  We need restorative sleep  8- 10 hours.  We should also try to have 10 to 12 hours at some point between meals, e.g. 7 PM to 7 AM     Fiber: Luis F Seed or Flax Seed or Hemp Seeds:  2-3 tablespoons daily for fiber and Omega 3s      Move everyday your body and sweat!    Get good 8 to 10 hours of Sleep and Hydrate with Water      If your Triglycerides are High:    Look into a Ketogenic Diet    Always choose --No added Sugar..    Fish Oil 2000 mg Daily and/or Flax or Luis F seeds  Milled 2 tablespoons                            Daily    Luis F seed in [place] Milk over night to make pudding    Nuts Especially walnuts.    Fish especially  [place], Mackerel, Anchovy,Sardine and Herring    Vegetables opt for multiple colors daily  New Goal 3- 5 cups of fruits and                         veggies Daily!!      Fermented Foods Rock!  You can do your own preserving and culturing of good bacteria!      Drink fermented Kombuchaa  Eat Cultured vegetable like Kimchi or Sauerkraut  Apple Cider Vinegar Unfiltered can be added 8 oz fizzy water  Foods:  Beets, Celery, Lemon, Lime, Grapefruit, Cucumber and Carrots    Use Bitter Herbs:  Nia, Arugala, Cilantro, Turmeric, Dandelion, CUmin, Fennel, Mint, Leeks, Parsley, and Milk THistle    Practice Fastin hours  "from last meal in evening to first meal in morming    Chlorophyll Rich Foods may help, add to water CHorella or Spirulina    Eat Kale and Broccoli Sprouts --- Sulfurophane rich      Eat Cruciferous Vegetables Daily:    Broccoli, Cauliflower, Kale, Collards, Brussel Sprouts    Eat Lots of Fiber:      Soluble:   Luis F, Flax Hemp, Pumpkin Seeds  Insoluble:  Fruits and Veggies  Best sources of  Chicory Root Ground, Dandelion Root, Asparagus, Leeks and Onion, Bananas ( more green), Sprouted Wheat eg John Bread , Garlic, [place] Artichokes,)       Lastly we have to think of things that are toxic to our health, which may contribute to poor health and disease.  An apple covered in pesticides has both healthy and toxic components for our system.  The very \"healthy choices\" may be laced with toxins.  So choose foods wisely and discriminatingly.      Here are some recommendations about when and when not to choose organic foods.  When in doubt wash!      The group identified the following items on its  Dirty Dozen  list of produce with the most pesticide residue:   1. Strawberries  2. Spinach  3. Nectarines  4. Apples  5. Grapes  6. Peaches  7. Cherries  8. Pears  9. Tomatoes  10. Celery  11. Potatoes  12. Sweet Bell Peppers  Here are the items the EWG identified for its  Clean 15,  which report the least likelihood to contain pesticide residue.  1. Avocados  2. Sweet Corn  3. Pineapples  4. Cabbages  5. Onions  6. Sweet Peas  7. Papayas  8. Asparagus  9. Mangoes  10. Eggplants  11. Honeydews  12. Kiwis  13. Cantaloupes  14. Cauliflower  15. Broccoli          Eat well, Get Good Sleep and Stay Active!    DanL        Return in about 6 months (around 9/18/2019).       Little interest or pleasure in doing things: Several days  Feeling down, depressed, or hopeless: Several days  Trouble falling or staying asleep, or sleeping too much: More than half the days  Feeling tired or having little energy: Several days  Poor appetite or " overeating: Not at all  Feeling bad about yourself - or that you are a failure or have let yourself or your family down: Not at all  Trouble concentrating on things, such as reading the newspaper or watching television: Several days  Moving or speaking so slowly that other people could have noticed. Or the opposite - being so fidgety or restless that you have been moving around a lot more than usual: Not at all  Thoughts that you would be better off dead, or of hurting yourself in some way: Not at all  PHQ-9 Total Score: 6    CHIEF COMPLAINT: Lucy Kaur had concerns including Follow-up (medication check).    [place]: 1.............. had concerns including Follow-up (medication check).    1. Moderate persistent asthma without complication    2. Bronchospasm    3. Cough    4. Acute bronchitis with bronchospasm    5. Attention deficit disorder (ADD) without hyperactivity    6. Attention deficit hyperactivity disorder (ADHD), predominantly inattentive type    7. Hypothyroidism, unspecified type    8. IUD contraception          CC:             Why are you here today?                              Medication refill    Patient comes in the day history of ADD predominantly inattentive symptoms  Allergies outlined as above  Moderate persistent asthma currently uses Symbicort occasionally inhalers  Moderate asthma control test today is 21  PHQ 9 is 6  She is due for refills of her medications with a controlled substance agreement signed        Any other Problems in order of Priority:        SUBJECTIVE:  Lucy Kaur is a 29 y.o. female    Past Medical History:   Diagnosis Date     Asthma      Past Surgical History:   Procedure Laterality Date     FOOT SURGERY Bilateral      NC REMOVAL GALLBLADDER      Description: Cholecystectomy;  Recorded: 05/26/2011;     TONSILLECTOMY AND ADENOIDECTOMY      AGE 8      Patient has no known allergies.  Current Outpatient Medications   Medication Sig Dispense Refill      budesonide-formoterol (SYMBICORT) 160-4.5 mcg/actuation inhaler INHALE 2 PUFFS BY MOUTH TWICE DAILY 1 Inhaler 11     [START ON 4/16/2019] dextroamphetamine-amphetamine (ADDERALL XR) 30 MG 24 hr capsule Take 1 capsule (30 mg total) by mouth every morning. 30 capsule 0     fluticasone (FLONASE) 50 mcg/actuation nasal spray 1-2 sprays in each nostril at bedtime. 16 g 0     ketotifen (ZADITOR/ZYRTEC ITCHY EYES) 0.025 % (0.035 %) ophthalmic solution APPLY 1 TO 2 DROPS PER EYE EVERY 4 TO 6 HOURS AS NEEDED FOR ITCHING OR BURNING 30 mL 2     lansoprazole (PREVACID) 30 MG capsule TAKE 1 CAPSULE BY MOUTH DAILY (Patient taking differently: TAKE 1 CAPSULE BY MOUTH DAILY PRN) 30 capsule 3     levothyroxine (SYNTHROID, LEVOTHROID) 100 MCG tablet Take 1 tablet (100 mcg total) by mouth daily. For hypothroidism 90 tablet 1     naltrexone (DEPADE) 50 mg tablet Take 1 tablet (50 mg total) by mouth daily. 30 tablet 5     albuterol (PROAIR HFA) 90 mcg/actuation inhaler INHALE 2 PUFFS BY MOUTH EVERY 4 HOURS AS NEEDED 2 Inhaler 12     albuterol (PROVENTIL) 2.5 mg /3 mL (0.083 %) nebulizer solution Take 3 mL (2.5 mg total) by nebulization every 4 (four) hours as needed for wheezing. 75 vial 5     cetirizine (ZYRTEC) 10 MG tablet Take 10 mg by mouth daily.       ibuprofen (ADVIL,MOTRIN) 600 MG tablet Take 1 tablet (600 mg total) by mouth 3 (three) times a day. 270 tablet 0     nebulizer accessories Misc Adult mask kit. Use as directed       nicotine polacrilex (NICORETTE) 4 MG gum USE 1 PIECE TO MOUTH AS NEEDED FOR SMOKING CESSATION. MAX 24 PIECES DAILY 170 each 2     predniSONE (DELTASONE) 10 mg tablet Take 3 tablets by mouth daily for 4 days, then take 2 tablets for 4 days, then 1 tablet for 4 days, then stop. 24 tablet 1     No current facility-administered medications for this visit.      Family History   Problem Relation Age of Onset     Mental illness Mother      Asthma Father      Social History     Socioeconomic History     Marital  status: Single     Spouse name: None     Number of children: None     Years of education: None     Highest education level: None   Occupational History     None   Social Needs     Financial resource strain: None     Food insecurity:     Worry: None     Inability: None     Transportation needs:     Medical: None     Non-medical: None   Tobacco Use     Smoking status: Former Smoker     Packs/day: 0.25     Smokeless tobacco: Never Used     Tobacco comment: quit 6 months ago   Substance and Sexual Activity     Alcohol use: No     Drug use: No     Sexual activity: Yes     Partners: Male     Birth control/protection: None, IUD   Lifestyle     Physical activity:     Days per week: None     Minutes per session: None     Stress: None   Relationships     Social connections:     Talks on phone: None     Gets together: None     Attends Jewish service: None     Active member of club or organization: None     Attends meetings of clubs or organizations: None     Relationship status: None     Intimate partner violence:     Fear of current or ex partner: None     Emotionally abused: None     Physically abused: None     Forced sexual activity: None   Other Topics Concern     None   Social History Narrative     None     Patient Active Problem List   Diagnosis     Hypothyroidism     Anxiety Disorder NOS     Abnormal Pap smear of cervix     Nicotine Dependence     ADHD, Predominantly Inattentive Type     Asthma     Arthralgias In Multiple Sites     Difficulty Breathing (Dyspnea)     Allergies     Chronic Major Depression     Speech Phonation Hoarse     Obesity     Carpal tunnel syndrome on both sides     IUD contraception                                              SOCIAL: She  reports that she has quit smoking. She smoked 0.25 packs per day. she has never used smokeless tobacco. She reports that she does not drink alcohol or use drugs.    REVIEW OF SYSTEMS:   Family history not pertinent to chief complaint or presenting  problem    Review of Systems:      Nervous System:  No headache, paresthesia or dizziness or fainting                                  Ears: No hearing loss or ringing in the ears    Eyes: No blurring of vision, Double Vision            No redness, itching or dryness.    Nose: No nosebleed or loss of smell    Mouth: No mouth sores or  coated tongue    Throat: No hoarseness or difficulty swallowing    Neck: No enlarged thyroid or lymph nodes.    Heart: No chest pain, palpitation or irregular heartbeat.                  Lungs: No shortness of breath, wheezing or hemoptysis.    Gastrointestinal: No nausea or vomiting, melena or blood in stools.    Kidney/Bladdr: No polyuria, polydipsia, or hematuria.                             Genital/Sexual: No Sex function Changes                                Skin: No rash    Muscles/Joints/Bones: No Muscle morning stiffness, No Effusion of a Joint     Review of systems otherwise negative as requested from patient, except   Those positive ROS outlined and discussed in [place].    OBJECTIVE:  /70 (Patient Site: Right Arm, Patient Position: Sitting, Cuff Size: Adult Regular)   Pulse 81   Wt (!) 226 lb (102.5 kg)   LMP 03/16/2019   SpO2 99%   Breastfeeding? No   BMI 41.34 kg/m      GENERAL:     No acute distress.   Alert and oriented X 3         Physical:    TMs are Kleenex and oropharynx is clear  Neck is supple she has palpable thyroid nodules both right less 1 cm left 1 cm mid isthmus on the right  No cervical or subclavicular nodes  Lungs are clear  Cardiac regular rate and rhythm no appreciable murmur  Full range of affect  No tremulousness        ASSESSMENT & PLAN      Lucy was seen today for follow-up.    Diagnoses and all orders for this visit:    Moderate persistent asthma without complication    Bronchospasm  -     albuterol (PROAIR HFA) 90 mcg/actuation inhaler; INHALE 2 PUFFS BY MOUTH EVERY 4 HOURS AS NEEDED    Cough  -     albuterol (PROVENTIL) 2.5 mg /3  mL (0.083 %) nebulizer solution; Take 3 mL (2.5 mg total) by nebulization every 4 (four) hours as needed for wheezing.    Acute bronchitis with bronchospasm  -     albuterol (PROVENTIL) 2.5 mg /3 mL (0.083 %) nebulizer solution; Take 3 mL (2.5 mg total) by nebulization every 4 (four) hours as needed for wheezing.    Attention deficit disorder (ADD) without hyperactivity  -     Drug Abuse 1+, Urine    Attention deficit hyperactivity disorder (ADHD), predominantly inattentive type  -     Discontinue: dextroamphetamine-amphetamine (ADDERALL XR) 30 MG 24 hr capsule; Take 1 capsule (30 mg total) by mouth every morning.  -     dextroamphetamine-amphetamine (ADDERALL XR) 30 MG 24 hr capsule; Take 1 capsule (30 mg total) by mouth every morning.    Hypothyroidism, unspecified type  -     T3, Total  -     T4, Free  -     T4, Total  -     Thyroid Peroxidase Antibody  -     Thyroid Stimulating Hormone (TSH)  -     US Thyroid; Future    IUD contraception    Other orders  -     levothyroxine (SYNTHROID, LEVOTHROID) 100 MCG tablet; Take 1 tablet (100 mcg total) by mouth daily. For hypothroidism        Return in about 6 months (around 9/18/2019).       Anticipatory Guidance and Symptomatic Cares Discussed   Advised to call back directly if there are further questions, or if these symptoms fail to improve as anticipated or worsen.  Return to clinic if patient has a clinical concern that warrants an exam.         I spent 25  minutes with this patient face to face, of which 50% or greater was spent in counseling and coordination of care with regards to Lucy was seen today for follow-up.    Diagnoses and all orders for this visit:    Moderate persistent asthma without complication    Bronchospasm  -     albuterol (PROAIR HFA) 90 mcg/actuation inhaler; INHALE 2 PUFFS BY MOUTH EVERY 4 HOURS AS NEEDED    Cough  -     albuterol (PROVENTIL) 2.5 mg /3 mL (0.083 %) nebulizer solution; Take 3 mL (2.5 mg total) by nebulization every 4  (four) hours as needed for wheezing.    Acute bronchitis with bronchospasm  -     albuterol (PROVENTIL) 2.5 mg /3 mL (0.083 %) nebulizer solution; Take 3 mL (2.5 mg total) by nebulization every 4 (four) hours as needed for wheezing.    Attention deficit disorder (ADD) without hyperactivity  -     Drug Abuse 1+, Urine    Attention deficit hyperactivity disorder (ADHD), predominantly inattentive type  -     Discontinue: dextroamphetamine-amphetamine (ADDERALL XR) 30 MG 24 hr capsule; Take 1 capsule (30 mg total) by mouth every morning.  -     dextroamphetamine-amphetamine (ADDERALL XR) 30 MG 24 hr capsule; Take 1 capsule (30 mg total) by mouth every morning.    Hypothyroidism, unspecified type  -     T3, Total  -     T4, Free  -     T4, Total  -     Thyroid Peroxidase Antibody  -     Thyroid Stimulating Hormone (TSH)  -     US Thyroid; Future    IUD contraception    Other orders  -     levothyroxine (SYNTHROID, LEVOTHROID) 100 MCG tablet; Take 1 tablet (100 mcg total) by mouth daily. For hypothroidism        Oseas Foster MD  Family Medicine   [facility] [id number] [phone]

## 2021-06-25 NOTE — TELEPHONE ENCOUNTER
One attempt to reach pt and relay information. LVM with clinic number for them to call back. If pt calls back please relay information below.   Rae Pepper CMA

## 2021-06-25 NOTE — TELEPHONE ENCOUNTER
----- Message from Oseas Foster MD sent at 6/13/2021  9:59 AM CDT -----  TSH out of goal     Plan  Start [place] 60 mg 1 Daily (double 30 mg and I will send a NEW Rx)  Recheck in 8 weeks

## 2021-06-25 NOTE — TELEPHONE ENCOUNTER
Left message to call back for: Rodney  Information to relay to patient:   Message from Oseas Foster MD sent at 6/13/2021  9:59 AM CDT -----  TSH out of goal      Plan  Start [place] 60 mg 1 Daily (double 30 mg and I will send a NEW Rx)  Recheck in 8 weeks

## 2021-06-25 NOTE — TELEPHONE ENCOUNTER
Prior Authorization Request  Who s requesting:  Patient  Pharmacy Name and Location: [facility]  Medication Name: Symbicort   Insurance Plan: [employer] out [facility]   Insurance Member ID Number:  [id number]  CoverMyMeds Key: N/A  Informed patient that prior authorizations can take up to 10 business days for response:   Yes  Okay to leave a detailed message: Yes

## 2021-06-25 NOTE — PROGRESS NOTES
ASSESSMENT & PLAN    Dermatitis  Neck   Itches  Prickles   Activated by sweat     Now neck anterior  Was eyes and corners of the mouth           Lucy was seen today for asthma.    Diagnoses and all orders for this visit:    Dermatitis  -     clotrimazole-betamethasone (LOTRISONE) cream; Apply to affected area 2 times daily up to 21 days to neck    Attention deficit hyperactivity disorder (ADHD), predominantly inattentive type  -     dextroamphetamine-amphetamine (ADDERALL XR) 30 MG 24 hr capsule; Take 1 capsule (30 mg total) by mouth every morning.  -     budesonide-formoteroL (SYMBICORT) 160-4.5 mcg/actuation inhaler; Inhale 2 puffs 2 (two) times a day.  -     dextroamphetamine-amphetamine (ADDERALL XR) 30 MG 24 hr capsule; Take 1 capsule (30 mg total) by mouth every morning.    Mild episode of recurrent major depressive disorder (H)    Class 3 severe obesity with serious comorbidity and body mass index (BMI) of 40.0 to 44.9 in adult, unspecified obesity type (H)    Asthma with acute exacerbation, unspecified asthma severity, unspecified whether persistent  -     predniSONE (DELTASONE) 20 MG tablet; Take 60 mg by mouth daily for 5 days, THEN 40 mg daily for 5 days, THEN 20 mg daily for 5 days.    Exposure to COVID-19 virus  -     COVID-19 Isauro RBD Sandra and Reflex Titer    Hypothyroidism, unspecified type  -     Thyroid [place]        Patient Instructions   Thanks for coming in today Rodney    Let us work on your asthma as well as on your weight rebalancing    I would like to get you back on Symbicort  2 puffs twice daily  For flares you can use 2 puffs in between every 2 hours for breathing difficulty    For now  Prednisone  60 mg daily for 5 days then  40 mg daily for 5 days then  20 mg daily for 5 days then  Stop    Check into [employer]  I think this will be much more economical    Lets also have you start working on portion control  Goal weight for you 180 pounds    Walk daily    Remember  for controlled substances like Adderall but you do have a visit every 6 months  Thank you for signing a controlled substance agreement today    For the rash in your neck  Send me a picture after 7 days of therapy  This will likely take 14 to 21 days to disappear completely    I think this is a fungus  Also please please please stop smoking!    DanL      No follow-ups on file.       Little interest or pleasure in doing things: Not at all  Feeling down, depressed, or hopeless: Not at all  Trouble falling or staying asleep, or sleeping too much: Several days  Feeling tired or having little energy: Several days  Poor appetite or overeating: Several days  Feeling bad about yourself - or that you are a failure or have let yourself or your family down: Not at all  Trouble concentrating on things, such as reading the newspaper or watching television: Several days  Moving or speaking so slowly that other people could have noticed. Or the opposite - being so fidgety or restless that you have been moving around a lot more than usual: Not at all  Thoughts that you would be better off dead, or of hurting yourself in some way: Not at all  PHQ-9 Total Score: 4  If you checked off any problems, how difficult have these problems made it for you to do your work, take care of things at home, or get along with other people?: Not difficult at all    CHIEF COMPLAINT: Lucy Kaur had concerns including Asthma.    [place]: 1.............. SUBJECTIVE:  Lucy Kaur is a 31 y.o. female had concerns including Asthma.    1. Dermatitis    2. Attention deficit hyperactivity disorder (ADHD), predominantly inattentive type    3. Mild episode of recurrent major depressive disorder (H)    4. Class 3 severe obesity with serious comorbidity and body mass index (BMI) of 40.0 to 44.9 in adult, unspecified obesity type (H)    5. Asthma with acute exacerbation, unspecified asthma severity, unspecified whether persistent    6. Exposure to  COVID-19 virus    7. Hypothyroidism, unspecified type      Breathing worse  Allergies probably triggers    No tick bite      No Known Allergies                      SOCIAL: She  reports that she has quit smoking. She smoked 0.25 packs per day. She has never used smokeless tobacco. She reports that she does not drink alcohol or use drugs.    REVIEW OF SYSTEMS:   Family history not pertinent to chief complaint or presenting problem    Review of systems otherwise negative as requested from patient, except   Those positive ROS outlined and discussed in [place].      VITALS:  Vitals:    06/01/21 1344   BP: 110/70   Patient Site: Left Arm   Patient Position: Sitting   Cuff Size: Adult Regular   Pulse: 76   Temp: 98.1  F (36.7  C)   TempSrc: Oral   Weight: (!) 229 lb 4 oz (104 kg)     Wt Readings from Last 3 Encounters:   06/01/21 (!) 229 lb 4 oz (104 kg)   03/12/20 (!) 237 lb (107.5 kg)   12/05/19 (!) 246 lb (111.6 kg)     Body mass index is 41.93 kg/m .    Physical Exam:  Tight wheezing bilaterally  No crackles  Normal word strings  Rash anterior neck extends 10 cm wide 5 cm tall central clearing with 1 small area of circular 10 mm x 6 mm does not completely vineet dry skin       I spent 30  minutes with this patient.  This includes pre-visit, intra-visit and post visit work an evaluation with regards to Lucy was seen today for asthma.    Diagnoses and all orders for this visit:    Dermatitis  -     clotrimazole-betamethasone (LOTRISONE) cream; Apply to affected area 2 times daily up to 21 days to neck    Attention deficit hyperactivity disorder (ADHD), predominantly inattentive type  -     dextroamphetamine-amphetamine (ADDERALL XR) 30 MG 24 hr capsule; Take 1 capsule (30 mg total) by mouth every morning.  -     budesonide-formoteroL (SYMBICORT) 160-4.5 mcg/actuation inhaler; Inhale 2 puffs 2 (two) times a day.  -     dextroamphetamine-amphetamine (ADDERALL XR) 30 MG 24 hr capsule; Take 1 capsule (30 mg total) by  mouth every morning.    Mild episode of recurrent major depressive disorder (H)    Class 3 severe obesity with serious comorbidity and body mass index (BMI) of 40.0 to 44.9 in adult, unspecified obesity type (H)    Asthma with acute exacerbation, unspecified asthma severity, unspecified whether persistent  -     predniSONE (DELTASONE) 20 MG tablet; Take 60 mg by mouth daily for 5 days, THEN 40 mg daily for 5 days, THEN 20 mg daily for 5 days.    Exposure to COVID-19 virus  -     COVID-19 Isauro RBD Sandra and Reflex Titer    Hypothyroidism, unspecified type  -     Thyroid [place]        Oseas Foster MD  [facility] [address] [phone]

## 2021-06-26 NOTE — PATIENT INSTRUCTIONS - HE
Thanks for coming in today Rodney    Let us work on your asthma as well as on your weight rebalancing    I would like to get you back on Symbicort  2 puffs twice daily  For flares you can use 2 puffs in between every 2 hours for breathing difficulty    For now  Prednisone  60 mg daily for 5 days then  40 mg daily for 5 days then  20 mg daily for 5 days then  Stop    Check into [employer] for pharmaceuticals  I think this will be much more economical    Lets also have you start working on portion control  Goal weight for you 180 pounds    Walk daily    Remember for controlled substances like Adderall but you do have a visit every 6 months  Thank you for signing a controlled substance agreement today    For the rash in your neck  Send me a picture after 7 days of therapy  This will likely take 14 to 21 days to disappear completely    I think this is a fungus  Also please please please stop smoking!    Jing

## 2021-07-03 NOTE — ADDENDUM NOTE
Addendum Note by Chance Foster MD at 3/12/2020  2:00 PM     Author: Chance Foster MD Service: -- Author Type: Physician    Filed: 3/13/2020 12:14 PM Encounter Date: 3/12/2020 Status: Signed    : Chance Foster MD (Physician)    Addended by: CHANCE FOSTER on: 3/13/2020 12:14 PM        Modules accepted: Orders

## 2021-07-04 NOTE — TELEPHONE ENCOUNTER
Telephone Encounter by Nancy Garrison at 6/7/2021  3:46 PM     Author: Nancy Garrisno Service: -- Author Type: --    Filed: 6/7/2021  3:49 PM Encounter Date: 6/3/2021 Status: Signed    : Nancy Garrison       No PA needed, Symbicort is covered per call to plan.  Did receive fax stating pharmacy has to process quantity of 10.2gm for claim to pay out on their end.   Called both [facility] pharmacy in [place] [phone] and also the [place] location [phone] and both state they do not have a Rx for this medication.  Let [place] pharmacy know that a Rx was sent on 6/1 but pharmacist states they only have a Rx from 2019.      Routing back to clinic, no further action can be taken by PA team medication is covered.

## 2021-07-04 NOTE — LETTER
Letter by Oseas Foster MD at      Author: Oseas Foster MD Service: -- Author Type: --    Filed:  Encounter Date: 6/1/2021 Status: (Other)       Non-Opioid Controlled Substance Agreement  Adderall   This is an agreement between you and your provider regarding safe and appropriate controlled substance prescribing.? Controlled substances are?medicines that can cause physical and mental dependence. The manufacturing, possession and use of these medicines are regulated by law.  We here at [facility] are making a commitment to work with you in your efforts to get better.? To support you in this work, we will help you schedule regular appointments for medicine refills. If we must cancel or change your appointment for any reason, we will make sure you have enough medication to last until your next appointment.      As a Provider, I will:     Listen carefully to your concerns while treating you with dignity.     Recommend a treatment plan that I believe is in your best interest and may involve therapies other than medication.      Review the chance of benefit and the chance of harm of this medicine with you regularly and evaluate the safety and effectiveness of this therapy.       Provide a plan on how to discontinue if the decision is made to stop this medicine.       As a Patient, I understand controlled substances:       Are prescribed by my care provider to help me function or work and manage my condition(s).?    Are strong medicines and can cause serious side effects.      Need to be taken exactly as prescribed.?Combining controlled substances with certain medicines or chemicals (such as illegal drugs, alcohol, sedatives, sleeping pills, and benzodiazepines) can be dangerous or even fatal.? If I stop taking my medicines suddenly, I may have severe withdrawal symptoms.     The risks, benefits, and side effects of these medicine(s) were explained to me. I agree that:    1. I will take part in other  treatments as advised by my care team. This may be psychiatry or counseling, physical therapy, behavioral therapy, group treatment or a referral to specialist.    2. I will keep all my appointments and understand this is part of the monitoring of controlled substance.?My care team may require an office visit for EVERY controlled substance refill. If I miss appointments or dont follow instructions, my care team may stop my medicine    3. I will take my medicines as prescribed. I will not change the dose or schedule unless my care team tells me to. There will be no refills if I run out early.      4. I may be contactedwithout warning and asked to complete a urine drug test or pill count at any time. If I dont give a urine sample or participate in a pill count, the care team may stop my medicine.    5. I will only receive controlled substance prescriptions from this clinic. If treated by another provider, I will let them know I am taking controlled substances and that I have a treatment agreement with this provider. I will inform this care team within one business day if I am given a prescription for any controlled substance by another healthcare provider. This care team may contact other providers and pharmacists about my use of the medicines.     6. It is up to me to make sure that I do not run out of my medicines on weekends or holidays.?If my care team is willing to refill my prescription without a visit, I must request refills only during office hours. Refills may take up to 3 business days to process.  I will use one pharmacy to fill all my controlled substance prescriptions.  I will notify the clinic if any changes are made due to insurance changes or medication availability.    7. I am responsible for my prescriptions. If the medicine/prescription is lost, stolen or destroyed, it will not be replaced.?I also agree not to share controlled substance medicines with anyone.     8. I am aware I should not use any  illegal or recreational drugs. I agree not to drink alcohol unless my care team says I can.     9. If I enroll in the [place] Medical Cannabis program, I will tell my care team.?    10. I will tell my care team right away if I become pregnant, have a new medical problem treated outside of my regular clinic, or have a change in my medications.     11. I understand that this medicine can affect my thinking, judgment and reaction time.? Alcohol and drugs affect the brain and body, which can affect the safety of a persons driving. Being under the influence of alcohol or drugs can affect a persons decision-making, behaviors, personal safety, and the safety of others. Driving while impaired (DWI) can occur if a person is driving, operating, or in physical control of a car, motorcycle, boat, snowmobile, ATV, motorbike, off-road vehicle, or any other motor vehicle ([place] Statute 169A.20). I understand the risk if I choose to drive or operate machinery  I understand that if I do not follow any of the conditions above, my prescriptions or treatment may be stopped or changed.     I agree that my provider, clinic care team, and pharmacy may work with any city, state or federal law enforcement agency that investigates the misuse, sale, or other diversion of my controlled medicine. I will allow my provider to discuss my care with or share a copy of this agreement with any other treating provider, pharmacy or emergency room where I receive care.?    I have read this agreement and have asked questions about anything I did not understand.    ________________________________________________________  Patient Signature - Lucy Kaur     ___________________                   Date     ________________________________________________________  Provider Signature - Oseas Foster MD       ___________________                   Date     ________________________________________________________  Witness Signature (required if  provider not present while patient signing)          ___________________                   Date

## 2021-07-06 VITALS
SYSTOLIC BLOOD PRESSURE: 110 MMHG | TEMPERATURE: 98.1 F | WEIGHT: 229.25 LBS | DIASTOLIC BLOOD PRESSURE: 70 MMHG | HEART RATE: 76 BPM | BODY MASS INDEX: 41.93 KG/M2

## 2021-07-06 ASSESSMENT — PATIENT HEALTH QUESTIONNAIRE - PHQ9: SUM OF ALL RESPONSES TO PHQ QUESTIONS 1-9: 4

## 2021-07-08 ASSESSMENT — ASTHMA QUESTIONNAIRES: ACT_TOTALSCORE: 6

## 2021-08-10 DIAGNOSIS — J45.901 MODERATE ASTHMA WITH EXACERBATION, UNSPECIFIED WHETHER PERSISTENT: Primary | ICD-10-CM

## 2021-08-10 RX ORDER — ALBUTEROL SULFATE 0.83 MG/ML
SOLUTION RESPIRATORY (INHALATION)
COMMUNITY
Start: 2020-05-08 | End: 2021-08-10

## 2021-08-12 RX ORDER — ALBUTEROL SULFATE 0.83 MG/ML
SOLUTION RESPIRATORY (INHALATION)
Qty: 225 ML | Refills: 1 | Status: SHIPPED | OUTPATIENT
Start: 2021-08-12 | End: 2022-10-11

## 2021-08-12 NOTE — TELEPHONE ENCOUNTER
"  Disp Refills Start End CAROLINA    albuterol (PROVENTIL) 2.5 mg /3 mL (0.083 %) nebulizer solution 225 mL 2 5/8/2020  No   Sig: USE 1 VIAL VIA NEBULIZER EVERY 4 HOURS AS NEEDED FOR WHEEZING   Sent to pharmacy as: albuterol sulfate 2.5 mg/3 mL (0.083 %) solution for nebulization (PROVENTIL)   E-Prescribing Status: Receipt confirmed by pharmacy (5/8/2020  7:45 AM CDT)       Routing refill request to provider for review/approval because:  ACT score    Last Written Prescription Date:  4/8/20  Last Fill Quantity: 225 ml,  # refills: 2   Last office visit provider:  6/1/21     Requested Prescriptions   Pending Prescriptions Disp Refills     albuterol (PROVENTIL) (2.5 MG/3ML) 0.083% neb solution 225 mL 1     Sig: USE 1 VIAL VIA NEBULIZER EVERY 4 HOURS AS NEEDED FOR WHEEZING       Asthma Maintenance Inhalers - Anticholinergics Failed - 8/11/2021  1:37 PM        Failed - Asthma control assessment score within normal limits in last 6 months     Please review ACT score.           Failed - Recent (6 mo) or future (30 days) visit within the authorizing provider's specialty     Patient had office visit in the last 6 months or has a visit in the next 30 days with authorizing provider or within the authorizing provider's specialty.  See \"Patient Info\" tab in inbasket, or \"Choose Columns\" in Meds & Orders section of the refill encounter.            Passed - Patient is age 12 years or older        Passed - Medication is active on med list       Short-Acting Beta Agonist Inhalers Protocol  Failed - 8/11/2021  1:37 PM        Failed - Asthma control assessment score within normal limits in last 6 months     Please review ACT score.           Failed - Recent (6 mo) or future (30 days) visit within the authorizing provider's specialty     Patient had office visit in the last 6 months or has a visit in the next 30 days with authorizing provider or within the authorizing provider's specialty.  See \"Patient Info\" tab in inbasket, or \"Choose " "Columns\" in Meds & Orders section of the refill encounter.            Passed - Patient is age 12 or older        Passed - Medication is active on med list         Signed Prescriptions Disp Refills    albuterol (PROVENTIL) (2.5 MG/3ML) 0.083% neb solution       Sig: USE 1 VIAL VIA NEBULIZER EVERY 4 HOURS AS NEEDED FOR WHEEZING       There is no refill protocol information for this order          Juan Mari RN 08/12/21 12:35 PM  "

## 2021-08-15 ENCOUNTER — HEALTH MAINTENANCE LETTER (OUTPATIENT)
Age: 31
End: 2021-08-15

## 2021-10-10 ENCOUNTER — HEALTH MAINTENANCE LETTER (OUTPATIENT)
Age: 31
End: 2021-10-10

## 2021-12-01 DIAGNOSIS — F90.0 ATTENTION DEFICIT HYPERACTIVITY DISORDER (ADHD), PREDOMINANTLY INATTENTIVE TYPE: Primary | ICD-10-CM

## 2021-12-01 NOTE — TELEPHONE ENCOUNTER
Reason for Call:  Medication or medication refill:    Do you use a [facility]?  Name of the pharmacy and phone number for the current request:    [facility] [place]    Name of the medication requested:   Adderall 30mg      Other request: n/a    Can we leave a detailed message on this number? YES    Phone number patient can be reached at: Cell number on file:    Telephone Information:   Mobile [phone]       Best Time: anytime    Call taken on 12/1/2021 at 1:50 PM by Karolina Garcia

## 2021-12-03 RX ORDER — DEXTROAMPHETAMINE SULFATE, DEXTROAMPHETAMINE SACCHARATE, AMPHETAMINE SULFATE AND AMPHETAMINE ASPARTATE 7.5; 7.5; 7.5; 7.5 MG/1; MG/1; MG/1; MG/1
30 CAPSULE, EXTENDED RELEASE ORAL EVERY MORNING
COMMUNITY
Start: 2021-06-26 | End: 2021-12-03

## 2021-12-03 RX ORDER — DEXTROAMPHETAMINE SULFATE, DEXTROAMPHETAMINE SACCHARATE, AMPHETAMINE SULFATE AND AMPHETAMINE ASPARTATE 7.5; 7.5; 7.5; 7.5 MG/1; MG/1; MG/1; MG/1
30 CAPSULE, EXTENDED RELEASE ORAL EVERY MORNING
Qty: 30 CAPSULE | Refills: 0 | Status: SHIPPED | OUTPATIENT
Start: 2021-12-03 | End: 2022-01-31

## 2021-12-03 NOTE — TELEPHONE ENCOUNTER
Medication: Adderall XR 30 MG  Last Date Filled    pulled: PROVIDER TO PULL FROM Epic.     Only PCP Prescribing? PROVIDER TO PULL  FROM Epic.  Taken as prescribed from physician notes? YES    CSA in last year: NO  Random Utox in last year: Completed 3/12/2020  (if any of the above answer NO - schedule with PCP)     Opioids + benzodiazepines? NO  (if the above answer YES - schedule with PCP every 6 months)     Is patient on the Executive Review Team? NO      All responses suggest: Refilling prescription

## 2022-01-06 ENCOUNTER — TELEPHONE (OUTPATIENT)
Dept: FAMILY MEDICINE | Facility: CLINIC | Age: 32
End: 2022-01-06
Payer: COMMERCIAL

## 2022-01-06 NOTE — TELEPHONE ENCOUNTER
"Patient calling to request updated [facility] paperwork regarding asthma. She says her work agrees that a previous form can be updated with \"no change\" written at the top with doctors signature and date. Please fax to [phone].     Ok for detailed message      "

## 2022-01-12 ENCOUNTER — TELEPHONE (OUTPATIENT)
Dept: FAMILY MEDICINE | Facility: CLINIC | Age: 32
End: 2022-01-12

## 2022-01-17 NOTE — TELEPHONE ENCOUNTER
Form was not scanned into chart 6 months ago. Asked patient to fax new forms to my fax number at [phone].

## 2022-01-24 NOTE — TELEPHONE ENCOUNTER
Pt is calling in and checking on the status of her FMLA forms.    Please call her=she is wondering if faxed back and iof not when to expect it.

## 2022-01-25 ENCOUNTER — APPOINTMENT (OUTPATIENT)
Dept: URGENT CARE | Facility: CLINIC | Age: 32
End: 2022-01-25
Payer: COMMERCIAL

## 2022-01-31 ENCOUNTER — VIRTUAL VISIT (OUTPATIENT)
Dept: FAMILY MEDICINE | Facility: CLINIC | Age: 32
End: 2022-01-31
Payer: COMMERCIAL

## 2022-01-31 DIAGNOSIS — J45.40 MODERATE PERSISTENT ASTHMA WITHOUT COMPLICATION: ICD-10-CM

## 2022-01-31 DIAGNOSIS — F90.0 ATTENTION DEFICIT HYPERACTIVITY DISORDER (ADHD), PREDOMINANTLY INATTENTIVE TYPE: ICD-10-CM

## 2022-01-31 PROBLEM — J02.9 SORE THROAT: Status: RESOLVED | Noted: 2020-10-30 | Resolved: 2022-01-31

## 2022-01-31 PROCEDURE — 99214 OFFICE O/P EST MOD 30 MIN: CPT | Mod: 95 | Performed by: FAMILY MEDICINE

## 2022-01-31 RX ORDER — BUDESONIDE AND FORMOTEROL FUMARATE DIHYDRATE 160; 4.5 UG/1; UG/1
2 AEROSOL RESPIRATORY (INHALATION)
COMMUNITY
Start: 2021-06-08 | End: 2022-06-27

## 2022-01-31 RX ORDER — ALBUTEROL SULFATE 90 UG/1
2 AEROSOL, METERED RESPIRATORY (INHALATION) EVERY 6 HOURS
Qty: 18 G | Refills: 0
Start: 2022-01-31

## 2022-01-31 RX ORDER — DEXTROAMPHETAMINE SULFATE, DEXTROAMPHETAMINE SACCHARATE, AMPHETAMINE SULFATE AND AMPHETAMINE ASPARTATE 7.5; 7.5; 7.5; 7.5 MG/1; MG/1; MG/1; MG/1
30 CAPSULE, EXTENDED RELEASE ORAL EVERY MORNING
Qty: 30 CAPSULE | Refills: 0 | Status: SHIPPED | OUTPATIENT
Start: 2022-02-26

## 2022-01-31 RX ORDER — DEXTROAMPHETAMINE SULFATE, DEXTROAMPHETAMINE SACCHARATE, AMPHETAMINE SULFATE AND AMPHETAMINE ASPARTATE 7.5; 7.5; 7.5; 7.5 MG/1; MG/1; MG/1; MG/1
30 CAPSULE, EXTENDED RELEASE ORAL EVERY MORNING
Qty: 30 CAPSULE | Refills: 0 | Status: SHIPPED | OUTPATIENT
Start: 2022-01-31 | End: 2022-05-24

## 2022-01-31 ASSESSMENT — PATIENT HEALTH QUESTIONNAIRE - PHQ9: SUM OF ALL RESPONSES TO PHQ QUESTIONS 1-9: 5

## 2022-01-31 ASSESSMENT — ASTHMA QUESTIONNAIRES
EMERGENCY_ROOM_LAST_YEAR_TOTAL: ONE
QUESTION_3 LAST FOUR WEEKS HOW OFTEN DID YOUR ASTHMA SYMPTOMS (WHEEZING, COUGHING, SHORTNESS OF BREATH, CHEST TIGHTNESS OR PAIN) WAKE YOU UP AT NIGHT OR EARLIER THAN USUAL IN THE MORNING: ONCE OR TWICE
QUESTION_2 LAST FOUR WEEKS HOW OFTEN HAVE YOU HAD SHORTNESS OF BREATH: ONCE OR TWICE A WEEK
QUESTION_5 LAST FOUR WEEKS HOW WOULD YOU RATE YOUR ASTHMA CONTROL: SOMEWHAT CONTROLLED
ACT_TOTALSCORE: 17
ACUTE_EXACERBATION_TODAY: NO
QUESTION_4 LAST FOUR WEEKS HOW OFTEN HAVE YOU USED YOUR RESCUE INHALER OR NEBULIZER MEDICATION (SUCH AS ALBUTEROL): TWO OR THREE TIMES PER WEEK
QUESTION_1 LAST FOUR WEEKS HOW MUCH OF THE TIME DID YOUR ASTHMA KEEP YOU FROM GETTING AS MUCH DONE AT WORK, SCHOOL OR AT HOME: SOME OF THE TIME
ACT_TOTALSCORE: 17

## 2022-01-31 NOTE — PATIENT INSTRUCTIONS
FMLA paperwork done today Kenrick  This is for chronic asthma  We expect you to have exacerbations  Continue the Symbicort as a controller medication 2 puffs twice daily  Remember you can use this every 3 hours during a flare as well    Adderall should be restarted 30 mg daily  Let us know if there are any side effects today  I have printed a controlled substance agreement this will be sent out to you for signature    Check in every 6 months    Jing

## 2022-01-31 NOTE — ASSESSMENT & PLAN NOTE
Allergies  Dog/Cats   Avocado   Environmental   Weather  Cold    Cough/ Short of breath  Dizziness HA         Symbicort 160-4.5  2 puffs BID  Albuterol Neb     FMLA

## 2022-01-31 NOTE — PROGRESS NOTES
Rodney is a 31 year old who is being evaluated via a billable telephone visit.      What phone number would you like to be contacted at? [phone]  How would you like to obtain your AVS? Jean Claude Quiroz   Rodney is a 31 year old who presents for the following health issues     HPI     Rash Gone from 6/2021     Problem List Items Addressed This Visit        Respiratory    Asthma  Age Dx 8 y/o FMLA 1/31/2022      Allergies  Dog/Cats   Avocado   Environmental   Weather  Cold    Cough/ Short of breath  Dizziness HA         Symbicort 160-4.5  2 puffs BID  Albuterol Neb     FMLA          Relevant Medications    budesonide-formoterol (SYMBICORT) 160-4.5 MCG/ACT Inhaler    albuterol (PROAIR HFA/PROVENTIL HFA/VENTOLIN HFA) 108 (90 Base) MCG/ACT inhaler       Behavioral    ADHD, Predominantly Inattentive Type  CSA verbal 10/30/2020   re-addressed 1/31/2022      Side Effects  NO     Sleep?  Ok   Jittery? No     Effects     Able focus /  GMSD          Relevant Medications    ADDERALL XR 30 MG 24 hr capsule    ADDERALL XR 30 MG 24 hr capsule (Start on 2/26/2022)        Patient Instructions   FMLA paperwork done today Kenrick  This is for chronic asthma  We expect you to have exacerbations  Continue the Symbicort as a controller medication 2 puffs twice daily  Remember you can use this every 3 hours during a flare as well    Adderall should be restarted 30 mg daily  Let us know if there are any side effects today  I have printed a controlled substance agreement this will be sent out to you for signature    Check in every 6 months    Jing        Has   Review of Systems     Objective           Vitals:  No vitals were obtained today due to virtual visit.    Physical Exam               Phone call duration: 16  minutes

## 2022-05-24 ENCOUNTER — OFFICE VISIT (OUTPATIENT)
Dept: FAMILY MEDICINE | Facility: CLINIC | Age: 32
End: 2022-05-24
Payer: COMMERCIAL

## 2022-05-24 VITALS
SYSTOLIC BLOOD PRESSURE: 120 MMHG | DIASTOLIC BLOOD PRESSURE: 60 MMHG | OXYGEN SATURATION: 98 % | WEIGHT: 239 LBS | BODY MASS INDEX: 43.71 KG/M2 | HEART RATE: 65 BPM

## 2022-05-24 DIAGNOSIS — Z12.4 CERVICAL CANCER SCREENING: Primary | ICD-10-CM

## 2022-05-24 DIAGNOSIS — J45.40 MODERATE PERSISTENT ASTHMA WITHOUT COMPLICATION: ICD-10-CM

## 2022-05-24 DIAGNOSIS — L70.9 ACNE, UNSPECIFIED ACNE TYPE: ICD-10-CM

## 2022-05-24 DIAGNOSIS — F33.0 MILD EPISODE OF RECURRENT MAJOR DEPRESSIVE DISORDER (H): ICD-10-CM

## 2022-05-24 DIAGNOSIS — J45.901 MODERATE ASTHMA WITH EXACERBATION, UNSPECIFIED WHETHER PERSISTENT: ICD-10-CM

## 2022-05-24 DIAGNOSIS — E66.813 CLASS 3 SEVERE OBESITY WITH SERIOUS COMORBIDITY AND BODY MASS INDEX (BMI) OF 40.0 TO 44.9 IN ADULT, UNSPECIFIED OBESITY TYPE (H): ICD-10-CM

## 2022-05-24 DIAGNOSIS — E66.01 CLASS 3 SEVERE OBESITY WITH SERIOUS COMORBIDITY AND BODY MASS INDEX (BMI) OF 40.0 TO 44.9 IN ADULT, UNSPECIFIED OBESITY TYPE (H): ICD-10-CM

## 2022-05-24 PROCEDURE — 99214 OFFICE O/P EST MOD 30 MIN: CPT | Performed by: FAMILY MEDICINE

## 2022-05-24 RX ORDER — PREDNISONE 10 MG/1
TABLET ORAL
Qty: 28 TABLET | Refills: 1 | Status: SHIPPED | OUTPATIENT
Start: 2022-05-24 | End: 2022-06-05

## 2022-05-24 RX ORDER — CEFUROXIME AXETIL 250 MG/1
250 TABLET ORAL 2 TIMES DAILY
Qty: 60 TABLET | Refills: 3 | Status: SHIPPED | OUTPATIENT
Start: 2022-05-24 | End: 2024-02-09

## 2022-05-24 NOTE — LETTER
[facility]  05/24/22  Patient: Lucy Kaur  YOB: 1990  Medical Record Number: [id number]                                                                                  Non-Opioid Controlled Substance Agreement    This is an agreement between you and your provider regarding safe and appropriate use of controlled substances prescribed by your care team. Controlled substances are?medicines that can cause physical and mental dependence (abuse).     There are strict laws about having and using these medicines. We here at [facility] are  committed to working with you in your efforts to get better. To support you in this work, we'll help you schedule regular office appointments for medicine refills. If we must cancel or change your appointment for any reason, we'll make sure you have enough medicine to last until your next appointment.     As a Provider, I will:     Listen carefully to your concerns while treating you with respect.     Recommend a treatment plan that I believe is in your best interest and may involve therapies other than medicine.      Talk with you often about the possible benefits and the risk of harm of any medicine that we prescribe for you.    Assess the safety of this medicine and check how well it works.      Provide a plan on how to taper (discontinue or go off) using this medicine if the decision is made to stop its use.      ::  As a Patient, I understand controlled substances:       Are prescribed by my care provider to help me function or work and manage my condition(s).?    Are strong medicines and can cause serious side effects.       Need to be taken exactly as prescribed.?Combining controlled substances with certain medicines or chemicals (such as illegal drugs, alcohol, sedatives, sleeping pills, and benzodiazepines) can be dangerous or even fatal.? If I stop taking my medicines suddenly, I may have severe withdrawal symptoms.     The  risks, benefits, and side effects of these medicine(s) were explained to me. I agree that:    1. I will take part in other treatments as advised by my care team. This may be psychiatry or counseling, physical therapy, behavioral therapy, group treatment or a referral to specialist.    2. I will keep all my appointments and understand this is part of the monitoring of controlled substances.?My care team may require an office visit for EVERY controlled substance refill. If I miss appointments or don t follow instructions, my care team may stop my medicine    3. I will take my medicines as prescribed. I will not change the dose or schedule unless my care team tells me to. There will be no refills if I run out early.      4. I may be asked to come to the clinic and complete a urine drug test or complete a pill count. If I don t give a urine sample or participate in a pill count, the care team may stop my medicine.    5. I will only receive controlled substance prescriptions from this clinic. If I am treated by another provider, I will tell them that I am taking controlled substances and that I have a treatment agreement with this provider. I will inform my [facility] care team within one business day if I am given a prescription for any controlled substance by another healthcare provider. My [facility] care team can contact other providers and pharmacists about my use of any medicines.    6. It is up to me to make sure that I don't run out of my medicines on weekends or holidays.?If my care team is willing to refill my prescription without a visit, I must request refills only during office hours. Refills may take up to 3 business days to process. I will use one pharmacy to fill all my controlled substance prescriptions. I will notify the clinic about any changes to my insurance or medicine availability.    7. I am responsible for my prescriptions. If the medicine/prescription is lost, stolen or destroyed,  it will not be replaced.?I also agree not to share controlled substance medicines with anyone.     8. I am aware I should not use any illegal or recreational drugs. I agree not to drink alcohol unless my care team says I can.     9. If I enroll in the [place] Medical Cannabis program, I will tell my care team before my next refill.    10. I will tell my care team right away if I become pregnant, have a new medical problem treated outside of my regular clinic, or have a change in my medicines.     11. I understand that this medicine can affect my thinking, judgment and reaction time.? Alcohol and drugs affect the brain and body, which can affect the safety of my driving. Being under the influence of alcohol or drugs can affect my decision-making, behaviors, personal safety and the safety of others. Driving while impaired (DWI) can occur if a person is driving, operating or in physical control of a car, motorcycle, boat, snowmobile, ATV, motorbike, off-road vehicle or any other motor vehicle ([place] Statute 169A.20). I understand the risk if I choose to drive or operate any vehicle or machinery.    I understand that if I do not follow any of the conditions above, my prescriptions or treatment may be stopped or changed.   I agree that my provider, clinic care team and pharmacy may work with any city, state or federal law enforcement agency that investigates the misuse, sale or other diversion of my controlled medicine. I will allow my provider to discuss my care with, or share a copy of, this agreement with any other treating provider, pharmacy or emergency room where I receive care.     I have read this agreement and have asked questions about anything I did not understand.    ________________________________________________________  Patient Signature - Lucy Kaur     ___________________                   Date     ________________________________________________________  Provider Signature - Oseas Foster  MD       ___________________                   Date     ________________________________________________________  Witness Signature (required if provider not present while patient signing)          ___________________                   Date

## 2022-05-24 NOTE — PROGRESS NOTES
Answers for HPI/ROS submitted by the patient on 5/24/2022  Do you have a cough?: No  Are you experiencing any wheezing in your chest?: No  Do you have any shortness of breath?: No  Use of rescue inhaler:: a few times a week  Taking Asthma medication as prescribed:: 0  Asthma triggers:: same as previous visit  Have you had any Emergency Room visits, Urgent Care visits, or Hospital Admissions since your last office visit?: No  How many servings of fruits and vegetables do you eat daily?: 0-1  On average, how many sweetened beverages do you drink each day (Examples: soda, juice, sweet tea, etc.  Do NOT count diet or artificially sweetened beverages)?: 1  How many minutes a day do you exercise enough to make your heart beat faster?: 60 or more  How many days a week do you exercise enough to make your heart beat faster?: 7  How many days per week do you miss taking your medication?: 0    Patient Instructions       Rodney great to see you.    I filled out paperwork    You have a lifelong condition and has been  I expect you to have exacerbations  You should do visits every 6 months    Intermittently you may need to take off from work  I put a maximum of 4-5 times per month, half day to 3 days per episode    I will look out for paperwork as it comes through    I put in a prescription for cefuroxime for perioral dermatitis    I put in a prescription for prednisone for an asthma flare            ASSESSMENT & PLAN    Asthma  Age Dx 8 y/o FMLA 1/31/2022   Triggers of asthma  Tobacco allergies dog environmental cats avocado    Periodic flares    Last visit today 5/24/2022  FMLA filled out  Waiting for official paperwork    Will require 2 visits per year    Likely of a visit every 3 months    Possible time off 4-5 times per month maximum half day to 3 days per episode    Continue Symbicort as needed use of albuterol          Lucy was seen today for follow up.    Diagnoses and all orders for this visit:    Cervical cancer  screening    Moderate asthma with exacerbation, unspecified whether persistent  -     predniSONE (DELTASONE) 10 MG tablet; Take 4 tablets (40 mg) by mouth daily for 4 days, THEN 2 tablets (20 mg) daily for 4 days, THEN 1 tablet (10 mg) daily for 4 days.    Mild episode of recurrent major depressive disorder (H)    Class 3 severe obesity with serious comorbidity and body mass index (BMI) of 40.0 to 44.9 in adult, unspecified obesity type (H)    Acne, unspecified acne type  -     cefuroxime (CEFTIN) 250 MG tablet; Take 1 tablet (250 mg) by mouth 2 times daily    Moderate persistent asthma without complication    Other orders  -     REVIEW OF HEALTH MAINTENANCE PROTOCOL ORDERS        There are no Patient Instructions on file for this visit.    Return in about 6 months (around 11/24/2022).       PATIENT HEALTH QUESTIONNAIRE-9 (PHQ - 9)    Over the last 2 weeks, how often have you been bothered by any of the following problems?    1. Little interest or pleasure in doing things -      2. Feeling down, depressed, or hopeless -      3. Trouble falling or staying asleep, or sleeping too much -     4. Feeling tired or having little energy -      5. Poor appetite or overeating -      6. Feeling bad about yourself - or that you are a failure or have let yourself or your family down -      7. Trouble concentrating on things, such as reading the newspaper or watching television -     8. Moving or speaking so slowly that other people could have noticed? Or the opposite - being so fidgety or restless that you have been moving around a lot more than usual     9. Thoughts that you would be better off dead or of hurting  yourself in some way     Total Score:       If you checked off any problems, how difficult have these problems made it for you to do your work, take care of things at home, or get along with other people?      Developed by Sukhi Ravi, Carol Pepper, Jesus Sanchez and colleagues, with an educational  myriam from [employer] No permission required to reproduce, translate, display or distribute. permission required to reproduce, translate, display or distribute.    CHIEF COMPLAINT: Lucy Kaur had concerns including Follow Up (Update ILA).    [place]: 1.............. SUBJECTIVE:  Lucy Kaur is a 32 year old female had concerns including Follow Up (Update FMILA).    1. Cervical cancer screening    2. Moderate asthma with exacerbation, unspecified whether persistent    3. Mild episode of recurrent major depressive disorder (H)    4. Class 3 severe obesity with serious comorbidity and body mass index (BMI) of 40.0 to 44.9 in adult, unspecified obesity type (H)    5. Acne, unspecified acne type    6. Moderate persistent asthma without complication          No Known Allergies                      SOCIAL: She  reports that she has quit smoking. She smoked 0.25 packs per day. She has never used smokeless tobacco. She reports that she does not drink alcohol and does not use drugs.    REVIEW OF SYSTEMS:   Family history not pertinent to chief complaint or presenting problem    Review of systems otherwise negative as requested from patient, except   Those positive ROS outlined and discussed in [place].      VITALS:  Vitals:    05/24/22 1341   BP: 120/60   Pulse: 65   SpO2: 98%   Weight: 108.4 kg (239 lb)     Wt Readings from Last 3 Encounters:   05/24/22 108.4 kg (239 lb)   06/01/21 104 kg (229 lb 4 oz)   03/12/20 107.5 kg (237 lb)     Body mass index is 43.71 kg/m .    Physical Exam:  Clear    No murmur   Acne        I spent  30  minutes with this patient.  This includes pre-visit, intra-visit and post visit work an evaluation with regards to Lucy was seen today for follow up.    Diagnoses and all orders for this visit:    Cervical cancer screening    Moderate asthma with exacerbation, unspecified whether persistent  -     predniSONE (DELTASONE) 10 MG tablet; Take 4 tablets (40 mg) by mouth daily for  4 days, THEN 2 tablets (20 mg) daily for 4 days, THEN 1 tablet (10 mg) daily for 4 days.    Mild episode of recurrent major depressive disorder (H)    Class 3 severe obesity with serious comorbidity and body mass index (BMI) of 40.0 to 44.9 in adult, unspecified obesity type (H)    Acne, unspecified acne type  -     cefuroxime (CEFTIN) 250 MG tablet; Take 1 tablet (250 mg) by mouth 2 times daily    Moderate persistent asthma without complication    Other orders  -     REVIEW OF HEALTH MAINTENANCE PROTOCOL ORDERS        Oseas Foster MD  [facility] [id number] [phone]

## 2022-05-24 NOTE — PATIENT INSTRUCTIONS
Rodney great to see you.    I filled out paperwork    You have a lifelong condition and has been  I expect you to have exacerbations  You should do visits every 6 months    Intermittently you may need to take off from work  I put a maximum of 4-5 times per month, half day to 3 days per episode    I will look out for paperwork as it comes through    I put in a prescription for cefuroxime for perioral dermatitis    I put in a prescription for prednisone for an asthma flare

## 2022-05-24 NOTE — ASSESSMENT & PLAN NOTE
Triggers of asthma  Tobacco allergies dog environmental cats avocado    Periodic flares    Last visit today 5/24/2022  FMLA filled out  Waiting for official paperwork    Will require 2 visits per year    Likely of a visit every 3 months    Possible time off 4-5 times per month maximum half day to 3 days per episode    Continue Symbicort as needed use of albuterol

## 2022-05-26 ENCOUNTER — TELEPHONE (OUTPATIENT)
Dept: FAMILY MEDICINE | Facility: CLINIC | Age: 32
End: 2022-05-26
Payer: COMMERCIAL

## 2022-05-27 NOTE — TELEPHONE ENCOUNTER
Pt calling in and they need the form back by 6/5/22.    Fax back to: [phone]     And also call pt to let her know when faxed. Rodney- [phone]

## 2022-06-27 DIAGNOSIS — J45.901 MODERATE ASTHMA WITH EXACERBATION, UNSPECIFIED WHETHER PERSISTENT: Primary | ICD-10-CM

## 2022-06-27 NOTE — TELEPHONE ENCOUNTER
Refill Request  Medication name: Pending Prescriptions:                       Disp   Refills    budesonide-formoterol (SYMBICORT) 160-4.5*                    Sig: Inhale 2 puffs into the lungs    Who prescribed the medication: patient reported   Last refill on medication:    [facility]  Last appointment with PCP: 5/24/22  Next appointment: Not due

## 2022-06-28 RX ORDER — BUDESONIDE AND FORMOTEROL FUMARATE DIHYDRATE 160; 4.5 UG/1; UG/1
2 AEROSOL RESPIRATORY (INHALATION) 2 TIMES DAILY
Qty: 10.2 G | Refills: 11 | Status: SHIPPED | OUTPATIENT
Start: 2022-06-28

## 2022-06-28 NOTE — TELEPHONE ENCOUNTER
"Outpatient Medication Detail     Disp Refills Start End CAROLINA   budesonide-formoteroL (SYMBICORT) 160-4.5 mcg/actuation inhaler 1 Inhaler 12 6/8/2021  No   Sig - Route: Inhale 2 puffs 2 (two) times a day. (dispense 10.2g) - Inhalation   Sent to pharmacy as: budesonide-formoteroL  mcg-4.5 mcg/actuation aerosol inhaler (Symbicort)   Notes to Pharmacy: Failed Flovent,  Dulera, Advair  And on Singulair   E-Prescribing Status: Receipt confirmed by pharmacy (6/8/2021  9:23 AM CDT)       budesonide-formoteroL (SYMBICORT) 160-4.5 mcg/actuation inhaler [[id number]]    Electronically signed by: Akash Flores, PharmD on 06/08/21 0923 Status: Active   Ordering user: Akash Flores, PharmD 06/08/21 0923 Ordering provider: Akash Flores, PharmD   Authorized by: Oseas Foster MD   Frequency: BID 06/08/21 - Until Discontinued   Diagnoses  Attention deficit hyperactivity disorder (ADHD), predominantly inattentive type [F90.0]   Medication comments: Failed Flovent,  Dulera, Advair  And on Singulair     Routing refill request to provider for review/approval because:  ACT score out of range.  Please clarify associated diagnosis.     Last office visit provider:  5/24/22     Requested Prescriptions   Pending Prescriptions Disp Refills     budesonide-formoterol (SYMBICORT) 160-4.5 MCG/ACT Inhaler       Sig: Inhale 2 puffs into the lungs       Inhaled Steroids Protocol Failed - 6/28/2022  8:43 AM        Failed - Asthma control assessment score within normal limits in last 6 months     Please review ACT score.           Passed - Patient is age 12 or older        Passed - Medication is active on med list        Passed - Recent (6 mo) or future (30 days) visit within the authorizing provider's specialty     Patient had office visit in the last 6 months or has a visit in the next 30 days with authorizing provider or within the authorizing provider's specialty.  See \"Patient Info\" tab in inbasket, or \"Choose Columns\" in Meds & " "Orders section of the refill encounter.           Long-Acting Beta Agonist Inhalers Protocol  Failed - 6/28/2022  8:43 AM        Failed - Asthma control assessment score within normal limits in last 6 months     Please review ACT score.           Passed - Patient is age 12 or older        Passed - Medication is active on med list        Passed - Recent (6 mo) or future (30 days) visit within the authorizing provider's specialty     Patient had office visit in the last 6 months or has a visit in the next 30 days with authorizing provider or within the authorizing provider's specialty.  See \"Patient Info\" tab in inbasket, or \"Choose Columns\" in Meds & Orders section of the refill encounter.                 Juan Mari RN 06/28/22 8:43 AM  "

## 2022-09-07 DIAGNOSIS — E03.9 HYPOTHYROIDISM, UNSPECIFIED TYPE: ICD-10-CM

## 2022-09-08 NOTE — TELEPHONE ENCOUNTER
"Former patient of Cristian & has not established care with another provider.  Please assign refill request to covering provider per clinic standard process.      Routing refill request to provider for review/approval because:  Labs not current:  tsh  Patient needs to be seen because it has been more than 1 year since last office visit.    Last Written Prescription Date:  7/8/22  Last Fill Quantity: 30,  # refills: 1   Last office visit provider:  5/24/22     Requested Prescriptions   Pending Prescriptions Disp Refills     ARMOUR THYROID 60 MG tablet 30 tablet 1     Sig: Take 1 tablet (60 mg) by mouth daily       Thyroid Protocol Failed - 9/7/2022  1:49 PM        Failed - Recent (12 mo) or future (30 days) visit within the authorizing provider's specialty     Patient has had an office visit with the authorizing provider or a provider within the authorizing providers department within the previous 12 mos or has a future within next 30 days. See \"Patient Info\" tab in inbasket, or \"Choose Columns\" in Meds & Orders section of the refill encounter.              Failed - Normal TSH on file in past 12 months     Recent Labs   Lab Test 06/01/21  1442   TSH 6.82*              Passed - Patient is 12 years or older        Passed - Medication is active on med list        Passed - No active pregnancy on record     If patient is pregnant or has had a positive pregnancy test, please check TSH.          Passed - No positive pregnancy test in past 12 months     If patient is pregnant or has had a positive pregnancy test, please check TSH.               Daphne Pickett RN 09/08/22 3:13 PM  "

## 2022-09-09 RX ORDER — THYROID 60 MG
60 TABLET ORAL DAILY
Qty: 30 TABLET | Refills: 1 | Status: SHIPPED | OUTPATIENT
Start: 2022-09-09 | End: 2024-02-09

## 2022-09-18 ENCOUNTER — HEALTH MAINTENANCE LETTER (OUTPATIENT)
Age: 32
End: 2022-09-18

## 2022-09-23 DIAGNOSIS — E03.9 HYPOTHYROIDISM, UNSPECIFIED TYPE: ICD-10-CM

## 2022-09-24 RX ORDER — THYROID 60 MG
TABLET ORAL
Qty: 30 TABLET | Refills: 1 | OUTPATIENT
Start: 2022-09-24

## 2022-10-07 DIAGNOSIS — J45.40 MODERATE PERSISTENT ASTHMA WITHOUT COMPLICATION: ICD-10-CM

## 2022-10-07 DIAGNOSIS — J45.901 MODERATE ASTHMA WITH EXACERBATION, UNSPECIFIED WHETHER PERSISTENT: ICD-10-CM

## 2022-10-08 NOTE — TELEPHONE ENCOUNTER
"Former patient of Cristian & has not established care with another provider.  Please assign refill request to covering provider per clinic standard process.      Routing refill request to provider for review/approval because:  act    Last Written Prescription Date:  8/12/21  Last Fill Quantity: 225,  # refills: 1   Last office visit provider:  5/24/22     Requested Prescriptions   Pending Prescriptions Disp Refills     albuterol (PROVENTIL) (2.5 MG/3ML) 0.083% neb solution 225 mL 1     Sig: USE 1 VIAL VIA NEBULIZER EVERY 4 HOURS AS NEEDED FOR WHEEZING       Asthma Maintenance Inhalers - Anticholinergics Failed - 10/7/2022  1:40 PM        Failed - Asthma control assessment score within normal limits in last 6 months     Please review ACT score.           Failed - Recent (6 mo) or future (30 days) visit within the authorizing provider's specialty     Patient had office visit in the last 6 months or has a visit in the next 30 days with authorizing provider or within the authorizing provider's specialty.  See \"Patient Info\" tab in inbasket, or \"Choose Columns\" in Meds & Orders section of the refill encounter.            Passed - Patient is age 12 years or older        Passed - Medication is active on med list       Short-Acting Beta Agonist Inhalers Protocol  Failed - 10/7/2022  1:40 PM        Failed - Asthma control assessment score within normal limits in last 6 months     Please review ACT score.           Failed - Recent (6 mo) or future (30 days) visit within the authorizing provider's specialty     Patient had office visit in the last 6 months or has a visit in the next 30 days with authorizing provider or within the authorizing provider's specialty.  See \"Patient Info\" tab in inbasket, or \"Choose Columns\" in Meds & Orders section of the refill encounter.            Passed - Patient is age 12 or older        Passed - Medication is active on med list             Daphne Pickett RN 10/08/22 3:09 PM  "

## 2022-10-11 RX ORDER — ALBUTEROL SULFATE 0.83 MG/ML
SOLUTION RESPIRATORY (INHALATION)
Qty: 225 ML | Refills: 1 | Status: SHIPPED | OUTPATIENT
Start: 2022-10-11

## 2023-01-20 ENCOUNTER — LAB REQUISITION (OUTPATIENT)
Dept: LAB | Facility: CLINIC | Age: 33
End: 2023-01-20

## 2023-01-20 DIAGNOSIS — E06.3 AUTOIMMUNE THYROIDITIS: ICD-10-CM

## 2023-01-20 LAB — TSH SERPL DL<=0.005 MIU/L-ACNC: 1.76 UIU/ML (ref 0.3–4.2)

## 2023-01-20 PROCEDURE — 84443 ASSAY THYROID STIM HORMONE: CPT | Performed by: FAMILY MEDICINE

## 2023-05-25 NOTE — TELEPHONE ENCOUNTER
Advise pt that Oseas Foster MD not in until Friday.  He requests appointment before further refills.  OK to use reserved spot for pt.   alert

## 2023-07-13 NOTE — TELEPHONE ENCOUNTER
Patient is out of medication.          Controlled Substance Refill Request  Medication Name:   Requested Prescriptions     Pending Prescriptions Disp Refills     dextroamphetamine-amphetamine (ADDERALL XR) 30 MG 24 hr capsule 30 capsule 0     Sig: Take 1 capsule (30 mg total) by mouth every morning.     Date Last Fill: 7/27/20  Is patient out of medication?:  Yes  Patient notified refills processed within 3 business days:  Yes  [facility]  Submit electronically to pharmacy  Controlled Substance Agreement on file:   Encounter-Level CSA Scan Date - 10/03/2017:    Scan on 10/6/2017 11:39 AM           Encounter-Level CSA Scan Date - 11/14/2016:    Scan on 11/14/2016        Last office visit:  3/12/20       [FreeTextEntry1] : The patient is here for a follow up visit to review their medications and chronic medical conditions.\par chronic lbp- spinal stenosis, cad, htn [de-identified] : chronic lbp with lle radic. will be seeing pain management\par needs refill on percocet. \par pain can be 10/10\par no b/b incont. \par Otherwise feels good. Appet, sleep, bms, voiding, mood all ok.\par Reviewed all interim as well as relevant prior consultations, labs and radiological studies.\par \par

## 2023-10-08 ENCOUNTER — HEALTH MAINTENANCE LETTER (OUTPATIENT)
Age: 33
End: 2023-10-08

## 2024-01-15 ENCOUNTER — LAB REQUISITION (OUTPATIENT)
Dept: LAB | Facility: CLINIC | Age: 34
End: 2024-01-15

## 2024-01-15 DIAGNOSIS — E04.1 NONTOXIC SINGLE THYROID NODULE: ICD-10-CM

## 2024-01-15 DIAGNOSIS — E66.01 MORBID (SEVERE) OBESITY DUE TO EXCESS CALORIES (H): ICD-10-CM

## 2024-01-15 LAB
ALBUMIN SERPL BCG-MCNC: 3.9 G/DL (ref 3.5–5.2)
ALP SERPL-CCNC: 80 U/L (ref 40–150)
ALT SERPL W P-5'-P-CCNC: 12 U/L (ref 0–50)
ANION GAP SERPL CALCULATED.3IONS-SCNC: 10 MMOL/L (ref 7–15)
AST SERPL W P-5'-P-CCNC: 13 U/L (ref 0–45)
BILIRUB SERPL-MCNC: <0.2 MG/DL
BUN SERPL-MCNC: 12.2 MG/DL (ref 6–20)
CALCIUM SERPL-MCNC: 9.2 MG/DL (ref 8.6–10)
CHLORIDE SERPL-SCNC: 105 MMOL/L (ref 98–107)
CREAT SERPL-MCNC: 0.79 MG/DL (ref 0.51–0.95)
DEPRECATED HCO3 PLAS-SCNC: 23 MMOL/L (ref 22–29)
EGFRCR SERPLBLD CKD-EPI 2021: >90 ML/MIN/1.73M2
GLUCOSE SERPL-MCNC: 109 MG/DL (ref 70–99)
INSULIN SERPL-ACNC: 78.9 UU/ML (ref 2.6–24.9)
POTASSIUM SERPL-SCNC: 3.9 MMOL/L (ref 3.4–5.3)
PROT SERPL-MCNC: 6.8 G/DL (ref 6.4–8.3)
SODIUM SERPL-SCNC: 138 MMOL/L (ref 135–145)
T3 SERPL-MCNC: 251 NG/DL (ref 85–202)
T4 FREE SERPL-MCNC: 0.85 NG/DL (ref 0.9–1.7)
TSH SERPL DL<=0.005 MIU/L-ACNC: 8.61 UIU/ML (ref 0.3–4.2)

## 2024-01-15 PROCEDURE — 84439 ASSAY OF FREE THYROXINE: CPT | Performed by: FAMILY MEDICINE

## 2024-01-15 PROCEDURE — 84480 ASSAY TRIIODOTHYRONINE (T3): CPT | Performed by: FAMILY MEDICINE

## 2024-01-15 PROCEDURE — 83525 ASSAY OF INSULIN: CPT | Performed by: FAMILY MEDICINE

## 2024-01-15 PROCEDURE — 84443 ASSAY THYROID STIM HORMONE: CPT | Performed by: FAMILY MEDICINE

## 2024-01-15 PROCEDURE — 80053 COMPREHEN METABOLIC PANEL: CPT | Performed by: FAMILY MEDICINE

## 2024-03-05 ENCOUNTER — LAB REQUISITION (OUTPATIENT)
Dept: LAB | Facility: CLINIC | Age: 34
End: 2024-03-05

## 2024-03-05 DIAGNOSIS — R73.03 PREDIABETES: ICD-10-CM

## 2024-03-05 PROCEDURE — 82040 ASSAY OF SERUM ALBUMIN: CPT | Performed by: FAMILY MEDICINE

## 2024-03-07 LAB
ALBUMIN SERPL BCG-MCNC: 4.3 G/DL (ref 3.5–5.2)
ALP SERPL-CCNC: 71 U/L (ref 40–150)
ALT SERPL W P-5'-P-CCNC: 34 U/L (ref 0–50)
ANION GAP SERPL CALCULATED.3IONS-SCNC: 11 MMOL/L (ref 7–15)
AST SERPL W P-5'-P-CCNC: 22 U/L (ref 0–45)
BILIRUB SERPL-MCNC: 0.2 MG/DL
BUN SERPL-MCNC: 14.4 MG/DL (ref 6–20)
CALCIUM SERPL-MCNC: 9.5 MG/DL (ref 8.6–10)
CHLORIDE SERPL-SCNC: 106 MMOL/L (ref 98–107)
CREAT SERPL-MCNC: 0.79 MG/DL (ref 0.51–0.95)
DEPRECATED HCO3 PLAS-SCNC: 21 MMOL/L (ref 22–29)
EGFRCR SERPLBLD CKD-EPI 2021: >90 ML/MIN/1.73M2
GLUCOSE SERPL-MCNC: 95 MG/DL (ref 70–99)
POTASSIUM SERPL-SCNC: 4.2 MMOL/L (ref 3.4–5.3)
PROT SERPL-MCNC: 7.6 G/DL (ref 6.4–8.3)
SODIUM SERPL-SCNC: 138 MMOL/L (ref 135–145)

## 2024-03-15 ENCOUNTER — ANESTHESIA EVENT (OUTPATIENT)
Dept: SURGERY | Facility: AMBULATORY SURGERY CENTER | Age: 34
End: 2024-03-15
Payer: COMMERCIAL

## 2024-03-18 ENCOUNTER — HOSPITAL ENCOUNTER (OUTPATIENT)
Facility: AMBULATORY SURGERY CENTER | Age: 34
Discharge: HOME | End: 2024-03-18
Attending: OBSTETRICS & GYNECOLOGY
Payer: COMMERCIAL

## 2024-03-18 ENCOUNTER — ANESTHESIA (OUTPATIENT)
Dept: SURGERY | Facility: AMBULATORY SURGERY CENTER | Age: 34
End: 2024-03-18
Payer: COMMERCIAL

## 2024-03-18 VITALS
RESPIRATION RATE: 16 BRPM | BODY MASS INDEX: 40.12 KG/M2 | HEART RATE: 69 BPM | HEIGHT: 62 IN | OXYGEN SATURATION: 96 % | DIASTOLIC BLOOD PRESSURE: 61 MMHG | TEMPERATURE: 96.8 F | WEIGHT: 218 LBS | SYSTOLIC BLOOD PRESSURE: 107 MMHG

## 2024-03-18 DIAGNOSIS — Z30.2 ENCOUNTER FOR FEMALE STERILIZATION PROCEDURE: ICD-10-CM

## 2024-03-18 DIAGNOSIS — R32 UNSPECIFIED URINARY INCONTINENCE: ICD-10-CM

## 2024-03-18 DIAGNOSIS — Z98.890 S/P LAPAROSCOPIC PROCEDURE: Primary | ICD-10-CM

## 2024-03-18 LAB
GLUCOSE BY METER: 89
HCG UR QL: NEGATIVE
HGB BLD-MCNC: 12.9 G/DL
INTERNAL QC OK POCT: NORMAL
POCT KIT EXPIRATION DATE: NORMAL
POCT KIT LOT NUMBER: NORMAL

## 2024-03-18 DEVICE — SLING, INCONTINENCE, GYNECAR TVT EXACT TVTRL: Type: IMPLANTABLE DEVICE | Site: VAGINA | Status: FUNCTIONAL

## 2024-03-18 RX ORDER — ONDANSETRON 4 MG/1
4 TABLET, ORALLY DISINTEGRATING ORAL EVERY 30 MIN PRN
Status: DISCONTINUED | OUTPATIENT
Start: 2024-03-18 | End: 2024-03-19 | Stop reason: HOSPADM

## 2024-03-18 RX ORDER — SODIUM CHLORIDE, SODIUM LACTATE, POTASSIUM CHLORIDE, CALCIUM CHLORIDE 600; 310; 30; 20 MG/100ML; MG/100ML; MG/100ML; MG/100ML
INJECTION, SOLUTION INTRAVENOUS CONTINUOUS
Status: DISCONTINUED | OUTPATIENT
Start: 2024-03-18 | End: 2024-03-19 | Stop reason: HOSPADM

## 2024-03-18 RX ORDER — ACETAMINOPHEN 325 MG/1
975 TABLET ORAL ONCE
Status: COMPLETED | OUTPATIENT
Start: 2024-03-18 | End: 2024-03-18

## 2024-03-18 RX ORDER — BUPIVACAINE HYDROCHLORIDE 2.5 MG/ML
INJECTION, SOLUTION INFILTRATION; PERINEURAL PRN
Status: DISCONTINUED | OUTPATIENT
Start: 2024-03-18 | End: 2024-03-18 | Stop reason: HOSPADM

## 2024-03-18 RX ORDER — FENTANYL CITRATE 50 UG/ML
INJECTION, SOLUTION INTRAMUSCULAR; INTRAVENOUS PRN
Status: DISCONTINUED | OUTPATIENT
Start: 2024-03-18 | End: 2024-03-18

## 2024-03-18 RX ORDER — NEOSTIGMINE METHYLSULFATE 1 MG/ML
VIAL (ML) INJECTION PRN
Status: DISCONTINUED | OUTPATIENT
Start: 2024-03-18 | End: 2024-03-18

## 2024-03-18 RX ORDER — ONDANSETRON 4 MG/1
4 TABLET, ORALLY DISINTEGRATING ORAL EVERY 8 HOURS PRN
Qty: 4 TABLET | Refills: 0 | Status: SHIPPED | OUTPATIENT
Start: 2024-03-18

## 2024-03-18 RX ORDER — ACETAMINOPHEN 325 MG/1
975 TABLET ORAL ONCE
Status: DISCONTINUED | OUTPATIENT
Start: 2024-03-18 | End: 2024-03-19 | Stop reason: HOSPADM

## 2024-03-18 RX ORDER — PROPOFOL 10 MG/ML
INJECTION, EMULSION INTRAVENOUS PRN
Status: DISCONTINUED | OUTPATIENT
Start: 2024-03-18 | End: 2024-03-18

## 2024-03-18 RX ORDER — OXYCODONE HYDROCHLORIDE 5 MG/1
5-10 TABLET ORAL EVERY 4 HOURS PRN
Qty: 10 TABLET | Refills: 0 | Status: SHIPPED | OUTPATIENT
Start: 2024-03-18

## 2024-03-18 RX ORDER — LIDOCAINE HYDROCHLORIDE 20 MG/ML
INJECTION, SOLUTION INFILTRATION; PERINEURAL PRN
Status: DISCONTINUED | OUTPATIENT
Start: 2024-03-18 | End: 2024-03-18

## 2024-03-18 RX ORDER — HYDROMORPHONE HCL IN WATER/PF 6 MG/30 ML
0.4 PATIENT CONTROLLED ANALGESIA SYRINGE INTRAVENOUS EVERY 5 MIN PRN
Status: DISCONTINUED | OUTPATIENT
Start: 2024-03-18 | End: 2024-03-19 | Stop reason: HOSPADM

## 2024-03-18 RX ORDER — OXYCODONE HYDROCHLORIDE 5 MG/1
5 TABLET ORAL
Status: DISCONTINUED | OUTPATIENT
Start: 2024-03-18 | End: 2024-03-19 | Stop reason: HOSPADM

## 2024-03-18 RX ORDER — LIDOCAINE 40 MG/G
CREAM TOPICAL
Status: DISCONTINUED | OUTPATIENT
Start: 2024-03-18 | End: 2024-03-19 | Stop reason: HOSPADM

## 2024-03-18 RX ORDER — NALOXONE HYDROCHLORIDE 0.4 MG/ML
0.1 INJECTION, SOLUTION INTRAMUSCULAR; INTRAVENOUS; SUBCUTANEOUS
Status: DISCONTINUED | OUTPATIENT
Start: 2024-03-18 | End: 2024-03-19 | Stop reason: HOSPADM

## 2024-03-18 RX ORDER — IBUPROFEN 800 MG/1
800 TABLET, FILM COATED ORAL ONCE
Status: DISCONTINUED | OUTPATIENT
Start: 2024-03-18 | End: 2024-03-19 | Stop reason: HOSPADM

## 2024-03-18 RX ORDER — FENTANYL CITRATE 0.05 MG/ML
50 INJECTION, SOLUTION INTRAMUSCULAR; INTRAVENOUS EVERY 5 MIN PRN
Status: DISCONTINUED | OUTPATIENT
Start: 2024-03-18 | End: 2024-03-19 | Stop reason: HOSPADM

## 2024-03-18 RX ORDER — LIDOCAINE HYDROCHLORIDE AND EPINEPHRINE 10; 10 MG/ML; UG/ML
INJECTION, SOLUTION INFILTRATION; PERINEURAL PRN
Status: DISCONTINUED | OUTPATIENT
Start: 2024-03-18 | End: 2024-03-18 | Stop reason: HOSPADM

## 2024-03-18 RX ORDER — CEFAZOLIN SODIUM/WATER 2 G/20 ML
SYRINGE (ML) INTRAVENOUS PRN
Status: DISCONTINUED | OUTPATIENT
Start: 2024-03-18 | End: 2024-03-18

## 2024-03-18 RX ORDER — IBUPROFEN 800 MG/1
800 TABLET, FILM COATED ORAL EVERY 6 HOURS PRN
Qty: 30 TABLET | Refills: 0 | Status: SHIPPED | OUTPATIENT
Start: 2024-03-18

## 2024-03-18 RX ORDER — DEXAMETHASONE SODIUM PHOSPHATE 4 MG/ML
INJECTION, SOLUTION INTRA-ARTICULAR; INTRALESIONAL; INTRAMUSCULAR; INTRAVENOUS; SOFT TISSUE PRN
Status: DISCONTINUED | OUTPATIENT
Start: 2024-03-18 | End: 2024-03-18

## 2024-03-18 RX ORDER — ONDANSETRON 2 MG/ML
INJECTION INTRAMUSCULAR; INTRAVENOUS PRN
Status: DISCONTINUED | OUTPATIENT
Start: 2024-03-18 | End: 2024-03-18

## 2024-03-18 RX ORDER — MAGNESIUM SULFATE HEPTAHYDRATE 40 MG/ML
4 INJECTION, SOLUTION INTRAVENOUS ONCE
Status: COMPLETED | OUTPATIENT
Start: 2024-03-18 | End: 2024-03-18

## 2024-03-18 RX ORDER — EPHEDRINE SULFATE 50 MG/ML
INJECTION, SOLUTION INTRAMUSCULAR; INTRAVENOUS; SUBCUTANEOUS PRN
Status: DISCONTINUED | OUTPATIENT
Start: 2024-03-18 | End: 2024-03-18

## 2024-03-18 RX ORDER — ONDANSETRON 2 MG/ML
4 INJECTION INTRAMUSCULAR; INTRAVENOUS EVERY 30 MIN PRN
Status: DISCONTINUED | OUTPATIENT
Start: 2024-03-18 | End: 2024-03-19 | Stop reason: HOSPADM

## 2024-03-18 RX ORDER — PROPOFOL 10 MG/ML
INJECTION, EMULSION INTRAVENOUS CONTINUOUS PRN
Status: DISCONTINUED | OUTPATIENT
Start: 2024-03-18 | End: 2024-03-18

## 2024-03-18 RX ORDER — FENTANYL CITRATE 0.05 MG/ML
25 INJECTION, SOLUTION INTRAMUSCULAR; INTRAVENOUS EVERY 5 MIN PRN
Status: DISCONTINUED | OUTPATIENT
Start: 2024-03-18 | End: 2024-03-19 | Stop reason: HOSPADM

## 2024-03-18 RX ORDER — OXYCODONE HYDROCHLORIDE 5 MG/1
5 TABLET ORAL
Status: COMPLETED | OUTPATIENT
Start: 2024-03-18 | End: 2024-03-18

## 2024-03-18 RX ORDER — GLYCOPYRROLATE 0.2 MG/ML
INJECTION, SOLUTION INTRAMUSCULAR; INTRAVENOUS PRN
Status: DISCONTINUED | OUTPATIENT
Start: 2024-03-18 | End: 2024-03-18

## 2024-03-18 RX ORDER — HYDROMORPHONE HCL IN WATER/PF 6 MG/30 ML
0.2 PATIENT CONTROLLED ANALGESIA SYRINGE INTRAVENOUS EVERY 5 MIN PRN
Status: DISCONTINUED | OUTPATIENT
Start: 2024-03-18 | End: 2024-03-19 | Stop reason: HOSPADM

## 2024-03-18 RX ORDER — KETOROLAC TROMETHAMINE 30 MG/ML
INJECTION, SOLUTION INTRAMUSCULAR; INTRAVENOUS PRN
Status: DISCONTINUED | OUTPATIENT
Start: 2024-03-18 | End: 2024-03-18

## 2024-03-18 RX ORDER — OXYCODONE HYDROCHLORIDE 10 MG/1
10 TABLET ORAL
Status: DISCONTINUED | OUTPATIENT
Start: 2024-03-18 | End: 2024-03-19 | Stop reason: HOSPADM

## 2024-03-18 RX ADMIN — Medication 150 MCG: at 11:45

## 2024-03-18 RX ADMIN — DEXAMETHASONE SODIUM PHOSPHATE 10 MG: 4 INJECTION, SOLUTION INTRA-ARTICULAR; INTRALESIONAL; INTRAMUSCULAR; INTRAVENOUS; SOFT TISSUE at 11:43

## 2024-03-18 RX ADMIN — Medication 30 MG: at 11:32

## 2024-03-18 RX ADMIN — Medication 100 MCG: at 12:36

## 2024-03-18 RX ADMIN — FENTANYL CITRATE 100 MCG: 50 INJECTION, SOLUTION INTRAMUSCULAR; INTRAVENOUS at 11:32

## 2024-03-18 RX ADMIN — Medication 10 MG: at 11:43

## 2024-03-18 RX ADMIN — EPHEDRINE SULFATE 5 MG: 50 INJECTION, SOLUTION INTRAMUSCULAR; INTRAVENOUS; SUBCUTANEOUS at 11:55

## 2024-03-18 RX ADMIN — ACETAMINOPHEN 975 MG: 325 TABLET ORAL at 10:51

## 2024-03-18 RX ADMIN — KETOROLAC TROMETHAMINE 15 MG: 30 INJECTION, SOLUTION INTRAMUSCULAR; INTRAVENOUS at 12:34

## 2024-03-18 RX ADMIN — Medication 150 MCG: at 11:42

## 2024-03-18 RX ADMIN — PROPOFOL 50 MCG/KG/MIN: 10 INJECTION, EMULSION INTRAVENOUS at 11:32

## 2024-03-18 RX ADMIN — ONDANSETRON 4 MG: 2 INJECTION INTRAMUSCULAR; INTRAVENOUS at 12:21

## 2024-03-18 RX ADMIN — GLYCOPYRROLATE 0.8 MG: 0.2 INJECTION, SOLUTION INTRAMUSCULAR; INTRAVENOUS at 12:41

## 2024-03-18 RX ADMIN — Medication 200 MCG: at 11:52

## 2024-03-18 RX ADMIN — MAGNESIUM SULFATE HEPTAHYDRATE 4 G: 40 INJECTION, SOLUTION INTRAVENOUS at 11:01

## 2024-03-18 RX ADMIN — LIDOCAINE HYDROCHLORIDE 3 ML: 20 INJECTION, SOLUTION INFILTRATION; PERINEURAL at 11:32

## 2024-03-18 RX ADMIN — OXYCODONE HYDROCHLORIDE 5 MG: 5 TABLET ORAL at 14:01

## 2024-03-18 RX ADMIN — PROPOFOL 160 MG: 10 INJECTION, EMULSION INTRAVENOUS at 11:32

## 2024-03-18 RX ADMIN — SODIUM CHLORIDE, SODIUM LACTATE, POTASSIUM CHLORIDE, CALCIUM CHLORIDE: 600; 310; 30; 20 INJECTION, SOLUTION INTRAVENOUS at 11:00

## 2024-03-18 RX ADMIN — FENTANYL CITRATE 50 MCG: 0.05 INJECTION, SOLUTION INTRAMUSCULAR; INTRAVENOUS at 13:05

## 2024-03-18 RX ADMIN — Medication 5 MG: at 12:41

## 2024-03-18 RX ADMIN — EPHEDRINE SULFATE 5 MG: 50 INJECTION, SOLUTION INTRAMUSCULAR; INTRAVENOUS; SUBCUTANEOUS at 12:36

## 2024-03-18 RX ADMIN — Medication 2 G: at 12:02

## 2024-03-18 NOTE — ANESTHESIA POSTPROCEDURE EVALUATION
Patient: Lucy Kaur    Procedure: Procedure(s):  LAPAROSCOPIC BILATERAL SALPINGECTOMY, CYSTOSCOPY AND TRANSVAGINAL TAPING       Anesthesia Type:  General    Note:  Disposition: Outpatient   Postop Pain Control: Uneventful            Sign Out: Well controlled pain   PONV: No   Neuro/Psych: Uneventful            Sign Out: Acceptable/Baseline neuro status   Airway/Respiratory: Uneventful            Sign Out: Acceptable/Baseline resp. status   CV/Hemodynamics: Uneventful            Sign Out: Acceptable CV status; No obvious hypovolemia; No obvious fluid overload   Other NRE:    DID A NON-ROUTINE EVENT OCCUR?        Last vitals:  Vitals Value Taken Time   /51 03/18/24 1315   Temp 98.6  F (37  C) 03/18/24 1250   Pulse 76 03/18/24 1317   Resp 16 03/18/24 1250   SpO2 96 % 03/18/24 1317   Vitals shown include unfiled device data.    Electronically Signed By: Roland Park MD  March 18, 2024  1:28 PM

## 2024-03-18 NOTE — INTERVAL H&P NOTE
"I have reviewed the surgical (or preoperative) H&P that is linked to this encounter, and examined the patient. There are no significant changes  PLAN: Laparoscopic bilateral salpingectomy; transvaginal taping  INDICATION: Desires permanent sterilization; genuine stress urinary incontinence    Clinical Conditions Present on Arrival:  Clinically Significant Risk Factors Present on Admission                  # Obesity: Estimated body mass index is 39.87 kg/m  as calculated from the following:    Height as of this encounter: 1.575 m (5' 2\").    Weight as of this encounter: 98.9 kg (218 lb).       "

## 2024-03-18 NOTE — OP NOTE
PROCEDURE NOTE - LAPAROSCOPY /BILATERAL SALPINGECTOMY  CYSTOSCOPY / TVT    NAME: Lucy Kaur   : 1990   MRN: [id number]      DATE OF SERVICE: 3/18/2024     PREOPERATIVE DIAGNOSIS:   Desires permanent sterilization; genuine stress urinary incontinence    POSTOPERATIVE DIAGNOSIS: Same    PROCEDURES: laparoscopy, bilateral salpingectomy; Tension-free Transvaginal Tape Placement with cystoscopy    SURGEON: Angela Ham DO      ASSISTANT:  OR Staff    ANESTHESIA: general     ESTIMATED BLOOD LOSS:200 ml    HISTORY OF PRESENT ILLNESS:   Lucy Kaur is a 34 year old female with history of  genuine stress urinary incontinence.  Additionally, patient desires permanent sterilization .     CONSENT:  She was met preoperatively, where we discussed the procedure and the risks associated with the procedure. She understood these to be, but not limited to injury to adjacent organs including bladder, bowel, ureter, infection and bleeding. Patient signed consent and was brought back to the operating room in stable condition.     PROCEDURE:  Patient underwent induction of a general anesthetic, she was carefully prepped and draped in sterile dorsal lithotomy position for the procedure. Timeout was performed. Bladder was drained with a Grewal catheter.   A sterile speculum was placed.  The anterior lip of the cervix was grasped with a single tooth tenaculum.  A uterine sound was placed into the uterus.     Attention was turned to the abdomen. An incision above the umbilicus was made. A Veress needle was introduced with a 2 pop technique, saline drop test confirmed adequate placement. Opening pressure was 3-4. Insufflation was done until 15mm of pressure was established. A 5 nonbladed trocar was placed below the umbilicus. Two more port sights were place in the right and left lower quadrants under direct visualization. Trandelenburg assistance was then used.     The procedure began with identifying the  anatomy. The uterus was visualized was found to normal in appearance and mobile, tubes appeared normal, ovaries were normal in size and contour bilaterally.     The 5 mm LigaSure was then used to resect the right fallopian tube in its entirety.  There was good hemostasis noted at the resection site.  A similar procedure was performed on the contralateral side.  Each tube was then removed through the 5 mm port.    30 mL of quarter percent Marcaine was instilled in the abdomen.  The trocars and gas were then removed from the abdomen. Skin was closed with 4-0 Vicryl suture. Steri-Strips applied.     Attention was then turned to the TVT portion of the procedure.  A weighted speculum was placed.  A Grewal was placed.  A 3 cm area underneath the urethrovesical neck was grasped in a vertical manner using Allis clamp and the vaginal mucosa was infiltrated with 0.5% Marcaine with epinephrine.  A sharp knife was used to make an incision in this area and then finger dissection was used to dissect laterally up behind the pubic bone.  The first trocar was then introduced on the patient s right side.  It perforated the fascia and then was used to hug the pubic bone until it perforated the anterior fascia and then the skin.  Cystoscopy was then performed with no evidence of bladder injury.  The same procedure was carried out on the contralateral side.  Then a Kenia dilator was introduced between the mesh and the urethra.  The covering was taken off the tape.  A 2-0 Vicryl was then used to close the underlying fascia and then used to close the mucosa.  There was a moderate amount bleeding that resolved.  The 2 suprapubic sites were closed using 2-0 Monocryl interrupted stitches.  All sponge and instrument counts were correct.  The patient tolerated the procedure well and was taken to the recovery room in good condition.    SPECIMEN SENT: Right fallopian tube, left fallopian tube    Angela Ham,          cc: Angela JOHNSON  Jitendra, DO

## 2024-03-18 NOTE — ANESTHESIA PREPROCEDURE EVALUATION
Anesthesia Pre-Procedure Evaluation    Patient: Lucy Kaur   MRN: [id number] : 1990        Procedure : Procedure(s):  LAPAROSCOPIC BILATERAL SALPINGECTOMY, REPAIR BLADDER DEFECT          Past Medical History:   Diagnosis Date     Asthma      Diabetes (H)      Thyroid disease       Past Surgical History:   Procedure Laterality Date     FOOT SURGERY Bilateral      HC REMOVAL GALLBLADDER      Description: Cholecystectomy;  Recorded: 2011;     TONSILLECTOMY & ADENOIDECTOMY      AGE 8       No Known Allergies   Social History     Tobacco Use     Smoking status: Former     Packs/day: .25     Types: Cigarettes     Smokeless tobacco: Never     Tobacco comments:     quit 6 months ago   Substance Use Topics     Alcohol use: No      Wt Readings from Last 1 Encounters:   24 98.9 kg (218 lb)        Anesthesia Evaluation            ROS/MED HX  ENT/Pulmonary:     (+)                     Intermittent, asthma  Treatment: Inhaler prn,                 Neurologic:  - neg neurologic ROS     Cardiovascular:  - neg cardiovascular ROS     METS/Exercise Tolerance: >4 METS    Hematologic:  - neg hematologic  ROS     Musculoskeletal:  - neg musculoskeletal ROS     GI/Hepatic:  - neg GI/hepatic ROS     Renal/Genitourinary:  - neg Renal ROS     Endo:     (+)  type II DM,        thyroid problem,     Obesity,       Psychiatric/Substance Use:  - neg psychiatric ROS     Infectious Disease:  - neg infectious disease ROS     Malignancy:  - neg malignancy ROS     Other:  - neg other ROS        Physical Exam    Airway  airway exam normal      Mallampati: II       Respiratory Devices and Support         Dental  no notable dental history         Cardiovascular   cardiovascular exam normal       Rhythm and rate: regular and normal     Pulmonary   pulmonary exam normal        breath sounds clear to auscultation       OUTSIDE LABS:  CBC:   Lab Results   Component Value Date    WBC 13.1 (H) 2020    WBC 9.0 2019     "HGB 13.0 02/25/2020    HGB 12.6 11/30/2019    HCT 39.3 02/25/2020    HCT 37.0 11/30/2019     02/25/2020     11/30/2019     BMP:   Lab Results   Component Value Date     03/05/2024     01/15/2024    POTASSIUM 4.2 03/05/2024    POTASSIUM 3.9 01/15/2024    CHLORIDE 106 03/05/2024    CHLORIDE 105 01/15/2024    CO2 21 (L) 03/05/2024    CO2 23 01/15/2024    BUN 14.4 03/05/2024    BUN 12.2 01/15/2024    CR 0.79 03/05/2024    CR 0.79 01/15/2024    GLC 95 03/05/2024     (H) 01/15/2024     COAGS: No results found for: \"PTT\", \"INR\", \"FIBR\"  POC:   Lab Results   Component Value Date    HCG Negative 03/18/2024     HEPATIC:   Lab Results   Component Value Date    ALBUMIN 4.3 03/05/2024    PROTTOTAL 7.6 03/05/2024    ALT 34 03/05/2024    AST 22 03/05/2024    ALKPHOS 71 03/05/2024    BILITOTAL 0.2 03/05/2024     OTHER:   Lab Results   Component Value Date    A1C 5.3 02/27/2010    REGULO 9.5 03/05/2024    TSH 8.61 (H) 01/15/2024    T4 0.85 (L) 01/15/2024    T3 251 (H) 01/15/2024       Anesthesia Plan    ASA Status:  3       Anesthesia Type: General.     - Airway: ETT   Induction: Intravenous, Propofol.   Maintenance: TIVA.        Consents    Anesthesia Plan(s) and associated risks, benefits, and realistic alternatives discussed. Questions answered and patient/representative(s) expressed understanding.     - Discussed:     - Discussed with:  Patient      - Extended Intubation/Ventilatory Support Discussed: No.      - Patient is DNR/DNI Status: No     Use of blood products discussed: No .     Postoperative Care    Pain management: IV analgesics, Oral pain medications, Multi-modal analgesia.   PONV prophylaxis: Ondansetron (or other 5HT-3), Dexamethasone or Solumedrol     Comments:             Roland Park MD    I have reviewed the pertinent notes and labs in the chart from the past 30 days and (re)examined the patient.  Any updates or changes from those notes are reflected in this note.              # " "Obesity: Estimated body mass index is 39.87 kg/m  as calculated from the following:    Height as of this encounter: 1.575 m (5' 2\").    Weight as of this encounter: 98.9 kg (218 lb).      "

## 2024-03-18 NOTE — DISCHARGE INSTRUCTIONS
[facility] Post Op Pain Medication  Next Dose  Instructions:     X__ Acetaminophen (Tylenol): Next Dose: __4:50______pm. Take 650-1,000 mg every 6 hours for mild to moderate pain.     *As an adult, you should not exceed 1,000 mg of acetaminophen (Tylenol) per dose or 4,000 mg per day. If you are taking a narcotic that contains acetaminophen (Tylenol) keep track of your Tylenol dosage.     _X_ Ibuprofen (Motrin, Advil, Aleve, etc.): Next Dose: _6:30_______pm. Take 600-800 mg every 6 hours for mild to moderate pain.     *As an adult, you should not exceed 800 mg of ibuprofen per dose or 3,200 mg per day.     _X_ Oxycodone: Next Dose: _6PM if needed______.  Take _1-2__ tab(s) every _4__ hours for moderate to severe pain.     *If you are prescribed narcotic pain medications (Oxycodone, Norco, Percocet, Tylenol #3, Dilaudid, etc.) then you should try to wean off them as tolerated. These are AS NEEDED medications, so if you are not having significant pain you should try to take fewer pills at a time or spread the doses out over a longer period of time than is written on the prescription. As an example, if you are prescribed 1-2 pills of pain medication every 4 hours AS NEEDED for pain, then you should begin taking only 1 pill every 4 hours as your pain tolerates. Then when 1 pill is giving good pain relief you should try to spread the doses out longer than 4 hours apart as you can tolerate it.         Zofran take as directed for nausea next  dose due @ 4:30 pm     __ Senna-Docusate (Stool Softener): Next dose: _______. Take as instructed on the bottle. This is an over-the-counter medication. Take this while taking narcotic medications to prevent constipation.             If you have any questions or concerns regarding your procedure, please contact Dr. Ham, her office number is [phone].       [facility]   Adult Discharge Orders & Instructions     For 24 hours after surgery    Get plenty  of rest.  A responsible adult must stay with you for at least 24 hours after you leave the hospital.   Do not drive or use heavy equipment.  If you have weakness or tingling, don't drive or use heavy equipment until this feeling goes away.  Do not drink alcohol.  Avoid strenuous or risky activities.  Ask for help when climbing stairs.   You may feel lightheaded.  IF so, sit for a few minutes before standing.  Have someone help you get up.   If you have nausea (feel sick to your stomach): Drink only clear liquids such as apple juice, ginger ale, broth or 7-Up.  Rest may also help.  Be sure to drink enough fluids.  Move to a regular diet as you feel able.  You may have a slight fever. Call the doctor if your fever is over 100 F (37.7 C) (taken under the tongue) or lasts longer than 24 hours.  You may have a dry mouth, a sore throat, muscle aches or trouble sleeping.  These should go away after 24 hours.  Do not make important or legal decisions.   Call your doctor for any of the followin.  Signs of infection (fever, growing tenderness at the surgery site, a large amount of drainage or bleeding, severe pain, foul-smelling drainage, redness, swelling).    2. It has been over 8 to 10 hours since surgery and you are still not able to urinate (pass water).    3.  Headache for over 24 hours.

## 2024-03-18 NOTE — LETTER
March 18, 2024      Lucy Kaur  [address] [facility] [address]        To Whom It May Concern:    Lucy Kaur was seen in our clinic. She may return to work with the following: No lifting greater than 15 pounds until further notice.      Sincerely,      Angela Ham, DO

## 2024-03-18 NOTE — ANESTHESIA PROCEDURE NOTES
Airway       Patient location during procedure: OR       Procedure Start/Stop Times: 3/18/2024 11:37 AM  Staff -        Anesthesiologist:  Roland Park MD       CRNA: José Miguel Tanner APRN CRNA       Performed By: CRNA  Consent for Airway        Urgency: elective  Indications and Patient Condition       Indications for airway management: leonarda-procedural       Induction type:intravenous       Mask difficulty assessment: 1 - vent by mask    Final Airway Details       Final airway type: endotracheal airway       Successful airway: ETT - single  Endotracheal Airway Details        ETT size (mm): 7.0       Cuffed: yes       Cuff volume (mL): 8       Successful intubation technique: direct laryngoscopy       DL Blade Type: Ritchie 2       Grade View of Cords: 1       Adjucts: stylet       Position: Right       Measured from: lips       Secured at (cm): 22       Bite block used: None    Post intubation assessment        Placement verified by: capnometry, equal breath sounds and chest rise        Number of attempts at approach: 1       Number of other approaches attempted: 0       Secured with: tape       Ease of procedure: easy       Dentition: Unchanged and Intact       Dental guard used and removed. Dental Guard Type: Proguard Red.    Medication(s) Administered   Medication Administration Time: 3/18/2024 11:37 AM

## 2024-10-31 ENCOUNTER — LAB REQUISITION (OUTPATIENT)
Dept: LAB | Facility: CLINIC | Age: 34
End: 2024-10-31

## 2024-10-31 DIAGNOSIS — L65.9 NONSCARRING HAIR LOSS, UNSPECIFIED: ICD-10-CM

## 2024-10-31 DIAGNOSIS — E06.3 AUTOIMMUNE THYROIDITIS: ICD-10-CM

## 2024-10-31 DIAGNOSIS — R73.03 PREDIABETES: ICD-10-CM

## 2024-10-31 LAB
ALBUMIN SERPL BCG-MCNC: 4.2 G/DL (ref 3.5–5.2)
ALP SERPL-CCNC: 69 U/L (ref 40–150)
ALT SERPL W P-5'-P-CCNC: 20 U/L (ref 0–50)
ANION GAP SERPL CALCULATED.3IONS-SCNC: 9 MMOL/L (ref 7–15)
AST SERPL W P-5'-P-CCNC: 14 U/L (ref 0–45)
BILIRUB SERPL-MCNC: 0.3 MG/DL
BUN SERPL-MCNC: 13.4 MG/DL (ref 6–20)
CALCIUM SERPL-MCNC: 9.2 MG/DL (ref 8.8–10.4)
CHLORIDE SERPL-SCNC: 103 MMOL/L (ref 98–107)
CREAT SERPL-MCNC: 0.62 MG/DL (ref 0.51–0.95)
EGFRCR SERPLBLD CKD-EPI 2021: >90 ML/MIN/1.73M2
ESTRADIOL SERPL-MCNC: 39 PG/ML
GLUCOSE SERPL-MCNC: 92 MG/DL (ref 70–99)
HCO3 SERPL-SCNC: 26 MMOL/L (ref 22–29)
IRON BINDING CAPACITY (ROCHE): 297 UG/DL (ref 240–430)
IRON SATN MFR SERPL: 23 % (ref 15–46)
IRON SERPL-MCNC: 67 UG/DL (ref 37–145)
POTASSIUM SERPL-SCNC: 4.5 MMOL/L (ref 3.4–5.3)
PROT SERPL-MCNC: 7.1 G/DL (ref 6.4–8.3)
SODIUM SERPL-SCNC: 138 MMOL/L (ref 135–145)
TSH SERPL DL<=0.005 MIU/L-ACNC: 0.42 UIU/ML (ref 0.3–4.2)

## 2024-10-31 PROCEDURE — 84443 ASSAY THYROID STIM HORMONE: CPT | Performed by: FAMILY MEDICINE

## 2024-10-31 PROCEDURE — 82627 DEHYDROEPIANDROSTERONE: CPT | Performed by: FAMILY MEDICINE

## 2024-10-31 PROCEDURE — 84460 ALANINE AMINO (ALT) (SGPT): CPT | Performed by: FAMILY MEDICINE

## 2024-10-31 PROCEDURE — 84403 ASSAY OF TOTAL TESTOSTERONE: CPT | Performed by: FAMILY MEDICINE

## 2024-10-31 PROCEDURE — 83550 IRON BINDING TEST: CPT | Performed by: FAMILY MEDICINE

## 2024-10-31 PROCEDURE — 82670 ASSAY OF TOTAL ESTRADIOL: CPT | Performed by: FAMILY MEDICINE

## 2024-11-01 LAB — DHEA-S SERPL-MCNC: 45 UG/DL (ref 35–430)

## 2024-11-02 LAB — TESTOST SERPL-MCNC: 14 NG/DL (ref 8–60)

## 2024-12-01 ENCOUNTER — HEALTH MAINTENANCE LETTER (OUTPATIENT)
Age: 34
End: 2024-12-01

## 2025-06-02 ENCOUNTER — LAB REQUISITION (OUTPATIENT)
Dept: LAB | Facility: CLINIC | Age: 35
End: 2025-06-02

## 2025-06-02 DIAGNOSIS — R73.03 PREDIABETES: ICD-10-CM

## 2025-06-02 DIAGNOSIS — E06.3 AUTOIMMUNE THYROIDITIS: ICD-10-CM

## 2025-06-02 DIAGNOSIS — E66.01 MORBID (SEVERE) OBESITY DUE TO EXCESS CALORIES (H): ICD-10-CM

## 2025-06-02 PROCEDURE — 80053 COMPREHEN METABOLIC PANEL: CPT | Performed by: FAMILY MEDICINE

## 2025-06-02 PROCEDURE — 84443 ASSAY THYROID STIM HORMONE: CPT | Performed by: FAMILY MEDICINE

## 2025-06-02 PROCEDURE — 84439 ASSAY OF FREE THYROXINE: CPT | Performed by: FAMILY MEDICINE

## 2025-06-02 PROCEDURE — 80061 LIPID PANEL: CPT | Performed by: FAMILY MEDICINE

## 2025-06-03 LAB
ALBUMIN SERPL BCG-MCNC: 4.4 G/DL (ref 3.5–5.2)
ALP SERPL-CCNC: 72 U/L (ref 40–150)
ALT SERPL W P-5'-P-CCNC: 16 U/L (ref 0–50)
ANION GAP SERPL CALCULATED.3IONS-SCNC: 11 MMOL/L (ref 7–15)
AST SERPL W P-5'-P-CCNC: 16 U/L (ref 0–45)
BILIRUB SERPL-MCNC: 0.2 MG/DL
BUN SERPL-MCNC: 18.1 MG/DL (ref 6–20)
CALCIUM SERPL-MCNC: 9.6 MG/DL (ref 8.8–10.4)
CHLORIDE SERPL-SCNC: 102 MMOL/L (ref 98–107)
CHOLEST SERPL-MCNC: 213 MG/DL
CREAT SERPL-MCNC: 0.75 MG/DL (ref 0.51–0.95)
EGFRCR SERPLBLD CKD-EPI 2021: >90 ML/MIN/1.73M2
FASTING STATUS PATIENT QL REPORTED: ABNORMAL
FASTING STATUS PATIENT QL REPORTED: NORMAL
GLUCOSE SERPL-MCNC: 89 MG/DL (ref 70–99)
HCO3 SERPL-SCNC: 25 MMOL/L (ref 22–29)
HDLC SERPL-MCNC: 61 MG/DL
LDLC SERPL CALC-MCNC: 123 MG/DL
NONHDLC SERPL-MCNC: 152 MG/DL
POTASSIUM SERPL-SCNC: 4.5 MMOL/L (ref 3.4–5.3)
PROT SERPL-MCNC: 7.5 G/DL (ref 6.4–8.3)
SODIUM SERPL-SCNC: 138 MMOL/L (ref 135–145)
T4 FREE SERPL-MCNC: 0.76 NG/DL (ref 0.9–1.7)
TRIGL SERPL-MCNC: 143 MG/DL
TSH SERPL DL<=0.005 MIU/L-ACNC: 8.04 UIU/ML (ref 0.3–4.2)